# Patient Record
Sex: FEMALE | Race: WHITE | Employment: STUDENT | URBAN - METROPOLITAN AREA
[De-identification: names, ages, dates, MRNs, and addresses within clinical notes are randomized per-mention and may not be internally consistent; named-entity substitution may affect disease eponyms.]

---
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
## 2022-08-23 ENCOUNTER — OFFICE VISIT (OUTPATIENT)
Dept: FAMILY MEDICINE CLINIC | Facility: CLINIC | Age: 22
End: 2022-08-23
Payer: COMMERCIAL

## 2022-08-23 VITALS
SYSTOLIC BLOOD PRESSURE: 112 MMHG | DIASTOLIC BLOOD PRESSURE: 76 MMHG | HEART RATE: 77 BPM | OXYGEN SATURATION: 100 % | WEIGHT: 100.6 LBS | HEIGHT: 65 IN | TEMPERATURE: 98 F | BODY MASS INDEX: 16.76 KG/M2

## 2022-08-23 DIAGNOSIS — R10.12 LEFT UPPER QUADRANT ABDOMINAL PAIN: Primary | ICD-10-CM

## 2022-08-23 DIAGNOSIS — F41.9 ANXIETY: ICD-10-CM

## 2022-08-23 DIAGNOSIS — R16.1 SPLENOMEGALY: ICD-10-CM

## 2022-08-23 DIAGNOSIS — R51.9 ACUTE NONINTRACTABLE HEADACHE, UNSPECIFIED HEADACHE TYPE: ICD-10-CM

## 2022-08-23 PROCEDURE — 99204 OFFICE O/P NEW MOD 45 MIN: CPT | Performed by: STUDENT IN AN ORGANIZED HEALTH CARE EDUCATION/TRAINING PROGRAM

## 2022-08-23 RX ORDER — HYDROXYZINE HYDROCHLORIDE 25 MG/1
25 TABLET, FILM COATED ORAL
Qty: 30 TABLET | Refills: 0 | Status: SHIPPED | OUTPATIENT
Start: 2022-08-23 | End: 2022-10-24 | Stop reason: ALTCHOICE

## 2022-08-23 NOTE — LETTER
August 23, 2022     Patient: Jeremiah Devi  YOB: 2000  Date of Visit: 8/23/2022      To Whom it May Concern:    Jeremiah Devi is under my professional care  Lilliana Rokyle was seen in my office on 8/23/2022  Lilliana Zepeda may return to work on 8/29/22  If you have any questions or concerns, please don't hesitate to call           Sincerely,          Sindi Steiner        CC: No Recipients

## 2022-08-23 NOTE — PROGRESS NOTES
Assessment/Plan:    Left upper quadrant abdominal pain  Reviewed previous labs done in June  Will order MRI of the abdomen due to unexplained splenomegaly with left upper quadrant pain  Will order amylase lipase  UA orders given in office with other labs  Will refer to Hematology Oncology for hematologic workup of splenomegaly and abdominal pain  Prescription for hydroxyzine given for anxiety to be taken before MRI procedure  Advised that medication can make you drowsy    Splenomegaly  Refer to hematology  MRI of the abdomen ordered to further evaluate splenomegaly  Diagnoses and all orders for this visit:    Left upper quadrant abdominal pain  Comments: Will order MRI of the abdomen  Orders:  -     Amylase; Future  -     Lipase; Future  -     MRI abdomen w wo contrast; Future  -     Cancel: POCT urine dip  -     Cancel: POCT urine HCG  -     UA (URINE) with reflex to Scope    Acute nonintractable headache, unspecified headache type  Comments:  Advised to take Aleve in the morning for headache in to help with abdominal pain  Recommend to limit NSAID usage unless pain is above 4-5  Splenomegaly  -     MRI abdomen w wo contrast; Future  -     Ambulatory Referral to Hematology / Oncology; Future    Anxiety  -     hydrOXYzine HCL (ATARAX) 25 mg tablet; Take 1 tablet (25 mg total) by mouth daily at bedtime Take 2 tablets 30 minutes before MRI procedure  Greater than 30 minutes spent reviewing patient's lab work and previous urgent care visits  In addition to 20 minutes spent with patient in room  Subjective:      Patient ID: Dimitri Hamman is a 23 y o  female  HPI    Patient is a 68-year-old female new patient presenting to clinic for left upper abdominal pain  Patient was in room with mother present and a portion of history was obtained from her  Patient has been having abdominal pain, nausea, occasional vomiting has been chronic going on for over half a year    She states that she has had hospital stay in the past at [facility] for which she was on antibiotics and had a CT scan of the abdomen which showed enlarged spleen  She has had multiple visits to urgent care for the same complaints  She was referred to GI who she states recommended MRI and endoscope initially  She is very anxious and did not want to undergo MRI so they ended up doing an ultrasound which showed splenomegaly but no other findings  Lab review from most recent labs in June showed elevated white count of 11 5 and elevated neutrophil number  EBV IgG capsid was positive but all other tests were negative  Patient was advised by specialist to see Hematology but never went  She works and is worried about her job and missing time  She states that abdominal pain is sharp and achy in nature it does not radiate  No foods seem to set off symptoms  Denies any heartburn symptoms  States that she is unsure of what sets it off  She did see urgent care yesterday and was given Cipro for UTI  She has not started antibiotics yet  She does state that she feels a little short of breath when the abdominal pain gets very bad  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  She denies any current urinary frequency or pain  She is unable to produce urine for testing today  The following portions of the patient's history were reviewed and updated as appropriate: allergies, current medications, past family history, past medical history, past social history, past surgical history and problem list     Review of Systems   Respiratory: Positive for shortness of breath  Negative for cough and wheezing  Cardiovascular: Negative for chest pain  Gastrointestinal: Positive for abdominal pain and nausea  Negative for constipation, diarrhea and vomiting  Splenomegaly   Genitourinary: Negative for difficulty urinating, dysuria, flank pain and urgency  Psychiatric/Behavioral: The patient is nervous/anxious  Objective:      /76 (BP Location: Left arm, Patient Position: Sitting, Cuff Size: Standard)   Pulse 77   Temp 98 °F (36 7 °C)   Ht 5' 5" (1 651 m)   Wt 45 6 kg (100 lb 9 6 oz)   SpO2 100%   BMI 16 74 kg/m²          Physical Exam  Vitals and nursing note reviewed  Constitutional:       General: She is not in acute distress  Appearance: Normal appearance  HENT:      Head: Normocephalic and atraumatic  Right Ear: Tympanic membrane, ear canal and external ear normal       Left Ear: Tympanic membrane, ear canal and external ear normal       Nose: Nose normal       Mouth/Throat:      Mouth: Mucous membranes are moist       Pharynx: Oropharynx is clear  Eyes:      Extraocular Movements: Extraocular movements intact  Conjunctiva/sclera: Conjunctivae normal       Pupils: Pupils are equal, round, and reactive to light  Cardiovascular:      Rate and Rhythm: Normal rate and regular rhythm  Heart sounds: Normal heart sounds  No murmur heard  No friction rub  No gallop  Pulmonary:      Effort: Pulmonary effort is normal  No respiratory distress  Breath sounds: Normal breath sounds  No wheezing  Abdominal:      General: Abdomen is flat  Bowel sounds are normal       Palpations: Abdomen is soft  There is splenomegaly  Tenderness: There is abdominal tenderness in the left upper quadrant  There is no right CVA tenderness, left CVA tenderness, guarding or rebound  Negative signs include Garcia's sign and McBurney's sign  Musculoskeletal:         General: No tenderness  Normal range of motion  Cervical back: Normal range of motion and neck supple  Lymphadenopathy:      Cervical: No cervical adenopathy  Skin:     General: Skin is warm and dry  Findings: No erythema or rash  Neurological:      General: No focal deficit present  Mental Status: She is alert and oriented to person, place, and time  Mental status is at baseline     Psychiatric:         Mood and Affect: Mood normal          Behavior: Behavior normal          Thought Content:  Thought content normal          Judgment: Judgment normal

## 2022-08-23 NOTE — ASSESSMENT & PLAN NOTE
Reviewed previous labs done in June  Will order MRI of the abdomen due to unexplained splenomegaly with left upper quadrant pain  Will order amylase lipase  UA orders given in office with other labs  Will refer to Hematology Oncology for hematologic workup of splenomegaly and abdominal pain  Prescription for hydroxyzine given for anxiety to be taken before MRI procedure    Advised that medication can make you drowsy

## 2022-08-29 ENCOUNTER — OFFICE VISIT (OUTPATIENT)
Dept: FAMILY MEDICINE CLINIC | Facility: CLINIC | Age: 22
End: 2022-08-29
Payer: COMMERCIAL

## 2022-08-29 VITALS
DIASTOLIC BLOOD PRESSURE: 70 MMHG | OXYGEN SATURATION: 98 % | HEIGHT: 65 IN | HEART RATE: 88 BPM | BODY MASS INDEX: 16.66 KG/M2 | TEMPERATURE: 98 F | WEIGHT: 100 LBS | SYSTOLIC BLOOD PRESSURE: 112 MMHG

## 2022-08-29 DIAGNOSIS — R16.1 SPLENOMEGALY: ICD-10-CM

## 2022-08-29 DIAGNOSIS — R51.9 ACUTE NONINTRACTABLE HEADACHE, UNSPECIFIED HEADACHE TYPE: ICD-10-CM

## 2022-08-29 DIAGNOSIS — R10.12 LEFT UPPER QUADRANT ABDOMINAL PAIN: Primary | ICD-10-CM

## 2022-08-29 PROCEDURE — 99213 OFFICE O/P EST LOW 20 MIN: CPT | Performed by: STUDENT IN AN ORGANIZED HEALTH CARE EDUCATION/TRAINING PROGRAM

## 2022-08-29 PROCEDURE — NAPROXEN 500MG NAPROXEN 500MG: Performed by: STUDENT IN AN ORGANIZED HEALTH CARE EDUCATION/TRAINING PROGRAM

## 2022-08-29 RX ORDER — NAPROXEN 500 MG/1
500 TABLET ORAL 2 TIMES DAILY WITH MEALS
Qty: 30 TABLET | Refills: 0
Start: 2022-08-29 | End: 2022-10-24 | Stop reason: ALTCHOICE

## 2022-08-29 NOTE — PROGRESS NOTES
Assessment/Plan:     Left upper quadrant abdominal pain  MRI of the abdomen has been ordered  During authorization, patient had MRI ordered by GI specialist back in June  Patient or mother has to call to get in for MRI appointment  Patient has been referred to hematology for workup of a blood related cause  Recommended to call listed number on referral   We will get case management on board if close appointment cannot be made  Labs from last visit have not been done by patient yet  Will start short trial of naproxen for pain and headache  Patient will need to return to work tomorrow  Splenomegaly  Referred to hematology  MRI of the abdomen ordered  Diagnoses and all orders for this visit:    Left upper quadrant abdominal pain  -     naproxen (Naprosyn) 500 mg tablet; Take 1 tablet (500 mg total) by mouth 2 (two) times a day with meals    Splenomegaly    Acute nonintractable headache, unspecified headache type  Comments:  Patient stated that she has poor vision and does not wear her eyeglasses at all  I recommended to wear her glasses daily to see if her headache improves  Orders:  -     naproxen (Naprosyn) 500 mg tablet; Take 1 tablet (500 mg total) by mouth 2 (two) times a day with meals          Patient is to return to work tomorrow  Subjective:      Patient ID: Petros Orellana is a 23 y o  female  HPI    The following portions of the patient's history were reviewed and updated as appropriate: allergies, current medications, past family history, past medical history, past social history, past surgical history and problem list     Patient is a 82-year-old female patient presenting to clinic for follow up on left upper abdominal pain  Patient was in room with mother present and a portion of history was obtained from her  Patient has been having abdominal pain, nausea, occasional vomiting has been chronic going on for over half a year    She states that she has had hospital stay in the past at [facility] for which she was on antibiotics and had a CT scan of the abdomen which showed enlarged spleen  She has had multiple visits to urgent care for the same complaints  She was referred to GI who she states recommended MRI and endoscope initially  She is very anxious and did not want to undergo MRI so they ended up doing an ultrasound which showed splenomegaly but no other findings  Lab review from most recent labs in June showed elevated white count of 11 5 and elevated neutrophil number  EBV IgG capsid was positive but all other tests were negative  Patient was advised by specialist to see Hematology but never went  She works and is worried about her job and missing time  Last visit was 6 days ago  Patient is back with dull aching pain in left upper quadrant and headache  I spoke to the patient's mother  They have not called about MRI or hematology referral yet  Patient's mother states that she had no problems over the weekend and was fine around her friends visiting  She suddenly started having her stomach pain again on Sunday night  I believe there may be an anxious component to the her symptoms  No new changes or worsening of pain  No radiating pain  10 minutes spent reviewing chart and previous visit  Review of Systems   Respiratory: Positive for shortness of breath  Intermittent when anxious   Cardiovascular: Negative for chest pain  Gastrointestinal: Positive for abdominal pain and nausea  Negative for constipation, diarrhea and vomiting  Genitourinary: Negative for difficulty urinating  Skin: Negative for rash  Neurological: Positive for headaches  Psychiatric/Behavioral: The patient is nervous/anxious            Objective:      /70 (BP Location: Left arm, Patient Position: Sitting, Cuff Size: Standard)   Pulse 88   Temp 98 °F (36 7 °C)   Ht 5' 5" (1 651 m)   Wt 45 4 kg (100 lb)   SpO2 98%   BMI 16 64 kg/m²          Physical Exam  Vitals and nursing note reviewed  Constitutional:       General: She is not in acute distress  Appearance: Normal appearance  HENT:      Head: Normocephalic and atraumatic  Right Ear: Tympanic membrane, ear canal and external ear normal       Left Ear: Tympanic membrane, ear canal and external ear normal       Nose: Nose normal       Mouth/Throat:      Mouth: Mucous membranes are moist       Pharynx: Oropharynx is clear  Eyes:      Extraocular Movements: Extraocular movements intact  Conjunctiva/sclera: Conjunctivae normal       Pupils: Pupils are equal, round, and reactive to light  Cardiovascular:      Rate and Rhythm: Normal rate and regular rhythm  Heart sounds: Normal heart sounds  No murmur heard  No friction rub  No gallop  Pulmonary:      Effort: Pulmonary effort is normal  No respiratory distress  Breath sounds: Normal breath sounds  No wheezing  Abdominal:      General: Abdomen is flat  Bowel sounds are normal       Tenderness: There is abdominal tenderness in the left upper quadrant  There is no right CVA tenderness, left CVA tenderness, guarding or rebound  Negative signs include Garcia's sign and McBurney's sign  Musculoskeletal:         General: No tenderness  Normal range of motion  Cervical back: Normal range of motion and neck supple  Lymphadenopathy:      Cervical: No cervical adenopathy  Skin:     General: Skin is warm and dry  Findings: No erythema or rash  Neurological:      General: No focal deficit present  Mental Status: She is alert and oriented to person, place, and time  Mental status is at baseline  Psychiatric:         Mood and Affect: Mood normal          Behavior: Behavior normal          Thought Content:  Thought content normal          Judgment: Judgment normal

## 2022-08-29 NOTE — ASSESSMENT & PLAN NOTE
MRI of the abdomen has been ordered  During authorization, patient had MRI ordered by GI specialist back in June  Patient or mother has to call to get in for MRI appointment  Patient has been referred to hematology for workup of a blood related cause  Recommended to call listed number on referral   We will get case management on board if close appointment cannot be made  Labs from last visit have not been done by patient yet  Will start short trial of naproxen for pain and headache  Patient will need to return to work tomorrow

## 2022-08-29 NOTE — LETTER
August 29, 2022     Patient: Dimitri Hamman  YOB: 2000  Date of Visit: 8/29/2022      To Whom it May Concern:    Dimitri Hamman is under my professional care  Ghoshsavannah Meehan was seen in my office on 8/29/2022  Davina Meehan may return to work on 8/30/22  Work excuse is for 8/23/22 until the indicated return date  If you have any questions or concerns, please don't hesitate to call           Sincerely,          April John        CC: No Recipients

## 2022-09-09 ENCOUNTER — TELEPHONE (OUTPATIENT)
Dept: OTHER | Facility: OTHER | Age: 22
End: 2022-09-09

## 2022-09-09 NOTE — TELEPHONE ENCOUNTER
[facility] called stating they received a fax regarding patient needing to be seen at their facility  They require additional information to be faxed to them  They need radiology report, labs, and all doctor notes    Please fax to [phone]

## 2022-09-09 NOTE — TELEPHONE ENCOUNTER
Patient's mother, Ember Sparks called in to confirm medication prescribed for patient to take prior to MRI today and when to take it  Prescribed Hydroxyzine HCl 25 mg and to take 2 tablets 30 minutes prior to MRI at 6 PM  Ember Sparks verbalized understanding to take before leaving home with 20 minute drive to facility and registration before imaging

## 2022-09-15 ENCOUNTER — TELEPHONE (OUTPATIENT)
Dept: FAMILY MEDICINE CLINIC | Facility: CLINIC | Age: 22
End: 2022-09-15

## 2022-09-15 NOTE — TELEPHONE ENCOUNTER
Patient and her mother stopped in to drop off her MRI results  I placed them on your desk and also faxed them to the scan team  Mom also stated she has an appt with the hematologist on 9/27 as well

## 2022-09-16 ENCOUNTER — TELEPHONE (OUTPATIENT)
Dept: FAMILY MEDICINE CLINIC | Facility: CLINIC | Age: 22
End: 2022-09-16

## 2022-09-16 DIAGNOSIS — Q62.5 DUPLICATED URETER, LEFT: ICD-10-CM

## 2022-09-16 DIAGNOSIS — N13.30 HYDRONEPHROSIS OF LEFT KIDNEY: Primary | ICD-10-CM

## 2022-09-16 DIAGNOSIS — R10.12 LEFT UPPER QUADRANT ABDOMINAL PAIN: ICD-10-CM

## 2022-09-16 NOTE — TELEPHONE ENCOUNTER
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

## 2022-10-10 LAB — EXTERNAL HIV SCREEN: NORMAL

## 2022-10-14 PROBLEM — N13.30 HYDRONEPHROSIS OF LEFT KIDNEY: Status: ACTIVE | Noted: 2022-10-14

## 2022-10-14 PROBLEM — Q62.5 DUPLICATED URETER, LEFT: Status: ACTIVE | Noted: 2022-10-14

## 2022-10-14 NOTE — PROGRESS NOTES
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  Recent hydronephrosis  Referred to Nephrology  Left upper quadrant abdominal pain  MRI of the abdomen was unremarkable for splenic findings  Prior history of gi workup  She was referred for splenomegaly workup to hematology and labs have shown low on iron and some phlegm a nate markers have been elevated  Per patient report she is going to have a skeletal scan and additional labs performed  There is still no cause for her splenomegaly  Splenomegaly  Workup with hematology has been negative for definitive cause yet  Diagnoses and all orders for this visit:    Physical exam, pre-employment    Mass of left breast, unspecified quadrant  Comments:  Patient reports palpable mass left breast   Will order ultrasound to evaluate at this time  Orders:  -     US breast left complete (abus); Future    Left upper quadrant abdominal pain    Hydronephrosis of left kidney    Splenomegaly    Underweight    Duplicated ureter, left          Will order ultrasound of the left breast due to patient report of lump sensation  Patient's mother has a history of multiple lumps of the breast   No reported history of cancer  Patient is clear to start work at this time  She is continuing to be worked up by Hematology for her splenomegaly  No definitive findings at this time  She is undergoing iron treatments for iron deficiency  I will advise patient based on her ultrasound results  Tentative follow-up recommended in 3 months  Patient is in agreement with the plan  Subjective:      Patient ID: Warden Mas is a 23 y o  female presenting for follow-up    See problem based assessment as above  HPI    The following portions of the patient's history were reviewed and updated as appropriate: allergies, current medications, past family history, past medical history, past social history, past surgical history and problem list     Patient is a 74-year-old female presenting for follow-up from previous evaluation for splenomegaly, left upper quadrant abdominal pain  She had an MRI performed which showed a double ureter on the left side with hydronephrosis in the upper portion of the kidney  Patient was referred to hematology and Urology for evaluation  Patient has been seeing Hematology Dr Chi Lovett  All lab workup has shown iron deficiency and some elevated inflammatory markers    Patient was referred to Urology for hydronephrosis  She has a visit with them next week  She is here for an employment physical today  She will be starting a new job  She denies any problems with mental status, physical activity, performing the activities of her new job  Physical exam was within normal limits  Patient does have a low BMI which has been slowly improving  She is being worked up by Hematology for some abnormal findings of low iron and elevated inflammatory markers with splenomegaly  She currently has no abdominal pain  She does not have any difficulties on exam today that would prevent her from doing her work  She reports recently that she feels that palpable mass of the left breast when she does a breast exam   Her mother states that she has a history of breast masses that had to be removed  They were all benign however  They would like to be evaluated with imaging  Review of Systems   Constitutional:        Reported palpable breast mass left side   Respiratory: Negative for shortness of breath (None currently  Does report some dyspnea when abdominal pain happen)  Cardiovascular: Negative for chest pain  Gastrointestinal: Positive for abdominal pain   Negative for constipation, diarrhea, nausea and vomiting  Splenomegaly   Genitourinary: Negative for difficulty urinating  History of hydronephrosis   Musculoskeletal: Positive for back pain  Skin: Negative for rash  Objective:      /80   Pulse 98   Ht 5' 3" (1 6 m)   Wt 44 5 kg (98 lb 3 2 oz)   SpO2 98%   BMI 17 40 kg/m²          Physical Exam  Vitals and nursing note reviewed  Constitutional:       General: She is not in acute distress  Appearance: Normal appearance  HENT:      Head: Normocephalic and atraumatic  Right Ear: Tympanic membrane, ear canal and external ear normal       Left Ear: Tympanic membrane, ear canal and external ear normal       Nose: Nose normal       Mouth/Throat:      Mouth: Mucous membranes are moist       Pharynx: Oropharynx is clear  Eyes:      Extraocular Movements: Extraocular movements intact  Conjunctiva/sclera: Conjunctivae normal       Pupils: Pupils are equal, round, and reactive to light  Cardiovascular:      Rate and Rhythm: Normal rate and regular rhythm  Heart sounds: Normal heart sounds  No murmur heard  No friction rub  No gallop  Pulmonary:      Effort: Pulmonary effort is normal  No respiratory distress  Breath sounds: Normal breath sounds  No wheezing  Abdominal:      General: Abdomen is flat  Bowel sounds are normal       Palpations: Abdomen is soft  Tenderness: There is abdominal tenderness (luq)  There is no right CVA tenderness or left CVA tenderness  Musculoskeletal:         General: No tenderness  Normal range of motion  Cervical back: Normal range of motion and neck supple  Lymphadenopathy:      Cervical: No cervical adenopathy  Skin:     General: Skin is warm and dry  Findings: No erythema or rash  Neurological:      General: No focal deficit present  Mental Status: She is alert and oriented to person, place, and time  Mental status is at baseline     Psychiatric:         Mood and Affect: Mood normal          Behavior: Behavior normal          Thought Content:  Thought content normal          Judgment: Judgment normal

## 2022-10-24 ENCOUNTER — OFFICE VISIT (OUTPATIENT)
Dept: FAMILY MEDICINE CLINIC | Facility: CLINIC | Age: 22
End: 2022-10-24

## 2022-10-24 VITALS
BODY MASS INDEX: 17.4 KG/M2 | DIASTOLIC BLOOD PRESSURE: 80 MMHG | SYSTOLIC BLOOD PRESSURE: 110 MMHG | HEART RATE: 98 BPM | OXYGEN SATURATION: 98 % | HEIGHT: 63 IN | WEIGHT: 98.2 LBS

## 2022-10-24 DIAGNOSIS — R10.12 LEFT UPPER QUADRANT ABDOMINAL PAIN: ICD-10-CM

## 2022-10-24 DIAGNOSIS — N13.30 HYDRONEPHROSIS OF LEFT KIDNEY: ICD-10-CM

## 2022-10-24 DIAGNOSIS — N63.20 MASS OF LEFT BREAST, UNSPECIFIED QUADRANT: ICD-10-CM

## 2022-10-24 DIAGNOSIS — R63.6 UNDERWEIGHT: ICD-10-CM

## 2022-10-24 DIAGNOSIS — Q62.5 DUPLICATED URETER, LEFT: ICD-10-CM

## 2022-10-24 DIAGNOSIS — R16.1 SPLENOMEGALY: ICD-10-CM

## 2022-10-24 DIAGNOSIS — Z02.1 PHYSICAL EXAM, PRE-EMPLOYMENT: Primary | ICD-10-CM

## 2022-10-24 PROCEDURE — 99213 OFFICE O/P EST LOW 20 MIN: CPT | Performed by: STUDENT IN AN ORGANIZED HEALTH CARE EDUCATION/TRAINING PROGRAM

## 2022-10-24 NOTE — ASSESSMENT & PLAN NOTE
Patient found to have left sided upper pole hydronephrosis on mri  It was persistent and not evaluated further  I referred patient to nephrology  She has appointment with Nephrology scheduled for next week

## 2022-10-24 NOTE — ASSESSMENT & PLAN NOTE
Found on mri  Congenital discovered on imaging incidentally  Recent hydronephrosis  Referred to Nephrology

## 2022-10-24 NOTE — ASSESSMENT & PLAN NOTE
MRI of the abdomen was unremarkable for splenic findings  Prior history of gi workup  She was referred for splenomegaly workup to hematology and labs have shown low on iron and some phlegm a nate markers have been elevated  Per patient report she is going to have a skeletal scan and additional labs performed  There is still no cause for her splenomegaly

## 2022-11-01 ENCOUNTER — TELEPHONE (OUTPATIENT)
Dept: FAMILY MEDICINE CLINIC | Facility: CLINIC | Age: 22
End: 2022-11-01

## 2022-11-02 ENCOUNTER — TELEMEDICINE (OUTPATIENT)
Dept: FAMILY MEDICINE CLINIC | Facility: CLINIC | Age: 22
End: 2022-11-02

## 2022-11-02 DIAGNOSIS — R53.83 OTHER FATIGUE: ICD-10-CM

## 2022-11-02 DIAGNOSIS — R09.81 CONGESTION OF NASAL SINUS: ICD-10-CM

## 2022-11-02 DIAGNOSIS — H92.03 OTALGIA OF BOTH EARS: ICD-10-CM

## 2022-11-02 DIAGNOSIS — B34.9 VIRAL SYNDROME: Primary | ICD-10-CM

## 2022-11-02 DIAGNOSIS — R05.1 ACUTE COUGH: ICD-10-CM

## 2022-11-02 LAB
SARS-COV-2 AG UPPER RESP QL IA: NEGATIVE
VALID CONTROL: NORMAL

## 2022-11-02 RX ORDER — METHYLPREDNISOLONE 4 MG/1
TABLET ORAL
Qty: 21 EACH | Refills: 0
Start: 2022-11-02

## 2022-11-02 NOTE — LETTER
November 2, 2022     Patient: Lakshmi Adair  YOB: 2000  Date of Visit: 11/2/2022      To Whom it May Concern:    Lakshmi Adair is under my professional care  Delores Dowd was seen in my office on 11/2/2022  Delores Dowd may return to work on 11/3/22  If you have any questions or concerns, please don't hesitate to call           Sincerely,          Susannah Myers        CC: No Recipients

## 2022-11-02 NOTE — PROGRESS NOTES
Virtual Regular Visit    Verification of patient location:    Patient is located in the following state in which I hold an active license [place]      Assessment/Plan:    Problem List Items Addressed This Visit    None     Visit Diagnoses     Viral syndrome    -  Primary    Relevant Medications    methylPREDNISolone 4 MG tablet therapy pack    Congestion of nasal sinus        Relevant Medications    methylPREDNISolone 4 MG tablet therapy pack    Other Relevant Orders    POCT Rapid Covid Ag (Completed)    Other fatigue        Relevant Orders    POCT Rapid Covid Ag (Completed)    Acute cough        Relevant Medications    methylPREDNISolone 4 MG tablet therapy pack    Other Relevant Orders    POCT Rapid Covid Ag (Completed)    Otalgia of both ears        Relevant Medications    methylPREDNISolone 4 MG tablet therapy pack        Patient's symptoms are consistent with a viral presentation  Covid test was done in office and was negative  Recommend supportive care at home  Patient was prescribed Medrol Dosepak  Advised that typical viral course last anywhere from 7-10 days  Will give patient return to work letter for tomorrow  I recommended against antibiotic use at this time patient is to call office if symptoms are lasting greater than 10 days and I will place her on Augmentin at that time  Patient and her mother are in agreement with the plan  Recommend supportive care with fluids, rest, flonase 1-2 sprays each nostril, otc claritin or zyrtec daily and mucinex as directed  Tylenol recommended prn for pain or fever    Instructed pt RTO if symptoms persist or worsen over the course of the next week       Reason for visit is   Chief Complaint   Patient presents with   • Shortness of Breath     Coughing, belly ache and earpain        Encounter provider [facility]    Provider located at [facility] [address] [place] [address] RD  [facility] [place] [id number]      Recent Visits  Date Type Provider Dept   11/01/22 Telephone Lesley Sanchez, [facility]   Showing recent visits within past 7 days and meeting all other requirements  Today's Visits  Date Type Provider Dept   11/02/22 Laci 19 today's visits and meeting all other requirements  Future Appointments  No visits were found meeting these conditions  Showing future appointments within next 150 days and meeting all other requirements       The patient was identified by name and date of birth  Naomi Osorio was informed that this is a telemedicine visit and that the visit is being conducted through the [employer]  She agrees to proceed     My office door was closed  No one else was in the room  She acknowledged consent and understanding of privacy and security of the video platform  The patient has agreed to participate and understands they can discontinue the visit at any time  Patient is aware this is a billable service  Melisa Castanon is a 23 y o  female established patient for acute sick visit  Cough  Patient complains of bilateral ear pain, headache frontal, nasal congestion and nonproductive cough, intermittent shortness of breath  Symptoms began 2 days ago  Symptoms have been gradually worsening since that time  The cough is nonproductive and is aggravated by nothing  Associated symptoms include: postnasal drip and shortness of breath  Patient does not have new pets  Patient does not have a history of asthma  Patient does not have a history of environmental allergens  Patient has not traveled recently  Patient reports no recent sick exposures  She did have an episode of diarrhea as well  No other reported symptoms at this time        HPI     Past Medical History:   Diagnosis Date   • Enlargement, spleen    • Hydronephrosis        Past Surgical History: Procedure Laterality Date   • KIDNEY SURGERY      5 mths old       Current Outpatient Medications   Medication Sig Dispense Refill   • methylPREDNISolone 4 MG tablet therapy pack Use as directed on package 21 each 0     No current facility-administered medications for this visit  No Known Allergies    Review of Systems   Constitutional: Positive for fatigue  HENT: Positive for congestion, ear pain, postnasal drip and rhinorrhea  Respiratory: Positive for cough and shortness of breath  Cardiovascular: Negative for chest pain  Gastrointestinal: Positive for diarrhea  Negative for abdominal pain, constipation, nausea and vomiting  Video Exam    There were no vitals filed for this visit  Physical Exam  Constitutional:       General: She is not in acute distress  Appearance: Normal appearance  She is ill-appearing  She is not toxic-appearing  HENT:      Head: Normocephalic and atraumatic  Right Ear: External ear normal       Left Ear: External ear normal       Nose: Congestion present  Mouth/Throat:      Mouth: Mucous membranes are moist       Comments: Postnasal drip  Eyes:      Extraocular Movements: Extraocular movements intact  Conjunctiva/sclera: Conjunctivae normal       Pupils: Pupils are equal, round, and reactive to light  Pulmonary:      Effort: Pulmonary effort is normal    Musculoskeletal:         General: Normal range of motion  Cervical back: Normal range of motion  Skin:     General: Skin is dry  Neurological:      Mental Status: She is alert and oriented to person, place, and time  Mental status is at baseline  Psychiatric:         Mood and Affect: Mood normal          Behavior: Behavior normal          Thought Content:  Thought content normal          Judgment: Judgment normal           I spent 15 minutes directly with the patient during this visit

## 2023-01-24 ENCOUNTER — CLINICAL SUPPORT (OUTPATIENT)
Dept: FAMILY MEDICINE CLINIC | Facility: CLINIC | Age: 23
End: 2023-01-24

## 2023-01-24 DIAGNOSIS — Z11.1 ENCOUNTER FOR PPD TEST: Primary | ICD-10-CM

## 2023-01-26 ENCOUNTER — CLINICAL SUPPORT (OUTPATIENT)
Dept: FAMILY MEDICINE CLINIC | Facility: CLINIC | Age: 23
End: 2023-01-26

## 2023-01-26 DIAGNOSIS — Z11.1 ENCOUNTER FOR PPD SKIN TEST READING: Primary | ICD-10-CM

## 2023-01-26 PROBLEM — N63.20 MASS OF LEFT BREAST: Status: ACTIVE | Noted: 2023-01-26

## 2023-01-26 LAB
INDURATION: 0 MM
TB SKIN TEST: NEGATIVE

## 2023-02-03 ENCOUNTER — OFFICE VISIT (OUTPATIENT)
Dept: FAMILY MEDICINE CLINIC | Facility: CLINIC | Age: 23
End: 2023-02-03

## 2023-02-03 VITALS
BODY MASS INDEX: 17.36 KG/M2 | OXYGEN SATURATION: 99 % | HEART RATE: 67 BPM | SYSTOLIC BLOOD PRESSURE: 108 MMHG | WEIGHT: 98 LBS | DIASTOLIC BLOOD PRESSURE: 70 MMHG | TEMPERATURE: 97.5 F | HEIGHT: 63 IN

## 2023-02-03 DIAGNOSIS — J01.90 ACUTE SINUSITIS, RECURRENCE NOT SPECIFIED, UNSPECIFIED LOCATION: Primary | ICD-10-CM

## 2023-02-03 DIAGNOSIS — R09.81 CONGESTION OF NASAL SINUS: ICD-10-CM

## 2023-02-03 DIAGNOSIS — J02.9 SORE THROAT: ICD-10-CM

## 2023-02-03 LAB
S PYO AG THROAT QL: NEGATIVE
SARS-COV-2 AG UPPER RESP QL IA: NEGATIVE
VALID CONTROL: NORMAL

## 2023-02-03 RX ORDER — FOLIC ACID 1 MG/1
TABLET ORAL
COMMUNITY
Start: 2023-02-01

## 2023-02-03 RX ORDER — GUAIFENESIN 600 MG/1
1200 TABLET, EXTENDED RELEASE ORAL EVERY 12 HOURS SCHEDULED
Qty: 20 TABLET | Refills: 0
Start: 2023-02-03

## 2023-02-03 RX ORDER — CHOLECALCIFEROL (VITAMIN D3) 1250 MCG
1 CAPSULE ORAL WEEKLY
COMMUNITY
Start: 2023-01-06

## 2023-02-03 RX ORDER — AMOXICILLIN 500 MG/1
CAPSULE ORAL
COMMUNITY
Start: 2023-01-31

## 2023-02-03 RX ORDER — AMOXICILLIN AND CLAVULANATE POTASSIUM 875; 125 MG/1; MG/1
1 TABLET, FILM COATED ORAL EVERY 12 HOURS SCHEDULED
Qty: 20 TABLET | Refills: 0
Start: 2023-02-03 | End: 2023-02-13

## 2023-02-03 NOTE — LETTER
February 3, 2023     Patient: Nael Ulloa  YOB: 2000  Date of Visit: 2/3/2023      To Whom it May Concern:    Nael Ulloa is under my professional care  Roxy Sebas was seen in my office on 2/3/2023  Roxy Mullen may return to work on 2/6/23  If you have any questions or concerns, please don't hesitate to call           Sincerely,          Richelle White        CC: No Recipients

## 2023-02-03 NOTE — PROGRESS NOTES
Assessment/Plan:    No problem-specific Assessment & Plan notes found for this encounter  Diagnoses and all orders for this visit:    Acute sinusitis, recurrence not specified, unspecified location  -     amoxicillin-clavulanate (Augmentin) 875-125 mg per tablet; Take 1 tablet by mouth every 12 (twelve) hours for 10 days  -     guaiFENesin (MUCINEX) 600 mg 12 hr tablet; Take 2 tablets (1,200 mg total) by mouth every 12 (twelve) hours    Congestion of nasal sinus  -     POCT Rapid Covid Ag  -     guaiFENesin (MUCINEX) 600 mg 12 hr tablet; Take 2 tablets (1,200 mg total) by mouth every 12 (twelve) hours    Sore throat  -     POCT rapid strepA    Other orders  -     amoxicillin (AMOXIL) 500 mg capsule; TAKE 1 CAPSULE 3 TIMES A DAY UNTIL GONE  -     folic acid (FOLVITE) 1 mg tablet  -     Cholecalciferol (Vitamin D3) 1 25 MG (25489 UT) CAPS; 1 capsule once a week          Patient is a 17-year-old female who presents with symptoms of a likely sinus infection  Current differential is viral versus bacterial   She is currently being treated by a dentist for a tooth infection  I will change her antibiotic from amoxicillin to Augmentin twice daily for 10 days  I will also provide her with some Mucinex from our office supply  I recommended to continue supportive care at home and stay hydrated  Recommend supportive care with fluids, rest, flonase 1-2 sprays each nostril, otc claritin or zyrtec daily and mucinex as directed  Tylenol recommended prn for pain or fever  Instructed pt RTO if symptoms persist or worsen over the course of the next week    Subjective:      Patient ID: Nicole Kirkpatrick is a 23 y o  female      HPI    The following portions of the patient's history were reviewed and updated as appropriate: allergies, current medications, past family history, past medical history, past social history, past surgical history and problem list     Nicole Kirkpatrick is a 23 y o  female who presents for evaluation of congestion, pressure, and pain in the nose  Symptoms include post nasal drip, shortness of breath, sinus pressure and tooth pain  Onset of symptoms was 3 days ago and has been gradually worsening since that time  Treatment to date: antibiotics  She has been taking some tylenol for the pain  She states that her brother at home was sick with strep recently  Review of Systems   HENT: Positive for congestion, postnasal drip and sinus pressure  Negative for ear pain  Tooth pain   Respiratory: Positive for shortness of breath (intermittent)  Negative for cough  Cardiovascular: Negative for chest pain  Gastrointestinal: Negative for abdominal pain, constipation, diarrhea, nausea and vomiting  Skin: Negative for rash  Objective:      /70 (BP Location: Right arm, Patient Position: Sitting, Cuff Size: Large)   Pulse 67   Temp 97 5 °F (36 4 °C)   Ht 5' 3" (1 6 m)   Wt 44 5 kg (98 lb)   SpO2 99%   BMI 17 36 kg/m²          Physical Exam  Vitals and nursing note reviewed  Constitutional:       General: She is not in acute distress  Appearance: Normal appearance  She is ill-appearing  HENT:      Head: Normocephalic and atraumatic  Right Ear: Tympanic membrane, ear canal and external ear normal  There is no impacted cerumen  Left Ear: Tympanic membrane, ear canal and external ear normal  There is no impacted cerumen  Nose: Congestion and rhinorrhea present  Mouth/Throat:      Mouth: Mucous membranes are moist       Pharynx: Oropharynx is clear  Posterior oropharyngeal erythema present  No oropharyngeal exudate  Comments: Postnasal drip  Eyes:      Extraocular Movements: Extraocular movements intact  Conjunctiva/sclera: Conjunctivae normal       Pupils: Pupils are equal, round, and reactive to light  Cardiovascular:      Rate and Rhythm: Normal rate and regular rhythm  Heart sounds: Normal heart sounds  No murmur heard  No friction rub   No gallop  Pulmonary:      Effort: Pulmonary effort is normal  No respiratory distress  Breath sounds: Normal breath sounds  No wheezing  Musculoskeletal:         General: Normal range of motion  Cervical back: Normal range of motion  Skin:     General: Skin is warm and dry  Findings: No erythema or rash  Neurological:      General: No focal deficit present  Mental Status: She is alert and oriented to person, place, and time  Mental status is at baseline  Psychiatric:         Mood and Affect: Mood normal          Behavior: Behavior normal          Thought Content:  Thought content normal          Judgment: Judgment normal

## 2023-04-07 ENCOUNTER — TELEPHONE (OUTPATIENT)
Dept: FAMILY MEDICINE CLINIC | Facility: CLINIC | Age: 23
End: 2023-04-07

## 2023-04-07 NOTE — TELEPHONE ENCOUNTER
Patient will come to the [place] [id number] to  her physical form on Monday 4/10/23  It'll be in the patient pick/up for her      Thanks

## 2023-04-28 DIAGNOSIS — N63.10 MASS OF RIGHT BREAST, UNSPECIFIED QUADRANT: Primary | ICD-10-CM

## 2023-04-28 NOTE — PROGRESS NOTES
Patient is at [facility] and reports feelings of right-sided breast lump  She would like an ultrasound of the right breast   I will order ultrasound to be faxed over as she is getting her left done currently

## 2023-05-01 ENCOUNTER — OFFICE VISIT (OUTPATIENT)
Age: 23
End: 2023-05-01

## 2023-05-01 VITALS
SYSTOLIC BLOOD PRESSURE: 98 MMHG | WEIGHT: 98 LBS | BODY MASS INDEX: 18.03 KG/M2 | DIASTOLIC BLOOD PRESSURE: 60 MMHG | HEART RATE: 120 BPM | HEIGHT: 62 IN | OXYGEN SATURATION: 99 %

## 2023-05-01 DIAGNOSIS — R63.6 UNDERWEIGHT: ICD-10-CM

## 2023-05-01 DIAGNOSIS — R11.0 NAUSEA: ICD-10-CM

## 2023-05-01 DIAGNOSIS — R10.9 ABDOMINAL PAIN, UNSPECIFIED ABDOMINAL LOCATION: ICD-10-CM

## 2023-05-01 DIAGNOSIS — Z09 FOLLOW-UP EXAM: Primary | ICD-10-CM

## 2023-05-01 DIAGNOSIS — R19.7 DIARRHEA, UNSPECIFIED TYPE: ICD-10-CM

## 2023-05-01 RX ORDER — OMEPRAZOLE 40 MG/1
40 CAPSULE, DELAYED RELEASE ORAL DAILY
Qty: 30 CAPSULE | Refills: 0 | Status: SHIPPED | OUTPATIENT
Start: 2023-05-01

## 2023-05-01 RX ORDER — ONDANSETRON 4 MG/1
4 TABLET, FILM COATED ORAL EVERY 8 HOURS PRN
Qty: 20 TABLET | Refills: 0 | Status: SHIPPED | OUTPATIENT
Start: 2023-05-01

## 2023-05-01 NOTE — PROGRESS NOTES
Assessment/Plan:    No problem-specific Assessment & Plan notes found for this encounter  Diagnoses and all orders for this visit:    Follow-up exam    Abdominal pain, unspecified abdominal location  -     omeprazole (PriLOSEC) 40 MG capsule; Take 1 capsule (40 mg total) by mouth daily  -     CBC and differential; Future  -     Comprehensive metabolic panel; Future  -     Lipase; Future  -     Stool Enteric Bacterial Panel by PCR; Future  -     Ova and parasite examination; Future  -     Calprotectin,Fecal; Future  -     Fecal fat, qualitative; Future    Nausea  -     ondansetron (ZOFRAN) 4 mg tablet; Take 1 tablet (4 mg total) by mouth every 8 (eight) hours as needed for nausea or vomiting    Diarrhea, unspecified type    Underweight          59-year-old female presenting for follow-up today with complaints of abdominal pain  We will continue work-up for her abdominal pain  I will place her on a trial of omeprazole to try and see if patient has a positive response  I will order stool studies along with a lipase and CBC and CMP  If this testing is normal then the next step will be a referral over to GI for possible endoscopy  I will keep patient off of work for the next 2 days due to her current nausea and feeling like she will vomit  20 minutes spent on physical exam and plan of care  This note was dictated using software  BMI Counseling: There is no height or weight on file to calculate BMI  The BMI is below normal  Patient advised to gain weight  Rationale for BMI follow-up plan is due to patient being underweight  Subjective:      Patient ID: Maureen Urena is a 23 y o  female      HPI    The following portions of the patient's history were reviewed and updated as appropriate: allergies, current medications, past family history, past medical history, past social history, past surgical history and problem list     Is a 59-year-old female presenting for follow-up today from an emergency "room visit  She continues to have complaints of left-sided abdominal pain  She had 2 episodes over the past 2 weeks for which she had to go to [address] [place] [address] emergency room  The first time she had lab work which was normal and was sent home with a stomach bug   Her second visit she had a CT scan which was also negative along with blood work and they told her the same thing  Her only abnormal finding on her exam to this point has been a slightly enlarged spleen  She continues to follow with the hematologist and they will be doing a bone marrow biopsy in the next few months  She has some nausea and left-sided abdominal pain that is moderate to sharp  There is no association with food  She denies any GERD along with the symptoms  It is an intermittent pain and will come and go  We have continue to work-up the pain and have not been able to find any explanation for her symptoms at this point  She is very underweight and I discussed with her doing boost drinks or other protein shake supplementation  Review of Systems   Constitutional: Positive for fatigue  Respiratory: Negative for shortness of breath  Cardiovascular: Negative for chest pain  Gastrointestinal: Positive for abdominal pain, nausea and vomiting  Negative for constipation and diarrhea  Genitourinary: Negative for difficulty urinating  Skin: Negative for rash  Objective:      BP 98/60 (BP Location: Right arm, Patient Position: Sitting, Cuff Size: Standard)   Pulse (!) 120   Ht 5' 2\" (1 575 m)   Wt 44 5 kg (98 lb)   SpO2 99%   BMI 17 92 kg/m²          Physical Exam  Vitals and nursing note reviewed  Constitutional:       General: She is not in acute distress  Appearance: Normal appearance  HENT:      Head: Normocephalic and atraumatic        Right Ear: External ear normal       Left Ear: External ear normal       Nose: Nose normal       Mouth/Throat:      Mouth: Mucous membranes are moist       Pharynx: Oropharynx is " clear    Eyes:      Extraocular Movements: Extraocular movements intact  Conjunctiva/sclera: Conjunctivae normal       Pupils: Pupils are equal, round, and reactive to light  Cardiovascular:      Rate and Rhythm: Normal rate and regular rhythm  Heart sounds: Normal heart sounds  No murmur heard  No friction rub  No gallop  Pulmonary:      Effort: Pulmonary effort is normal  No respiratory distress  Breath sounds: Normal breath sounds  No wheezing  Abdominal:      General: Abdomen is flat  Bowel sounds are normal       Palpations: Abdomen is soft  Tenderness: There is abdominal tenderness in the epigastric area and left upper quadrant  There is no guarding or rebound  Musculoskeletal:         General: Normal range of motion  Cervical back: Normal range of motion  Skin:     General: Skin is warm and dry  Findings: No erythema or rash  Neurological:      General: No focal deficit present  Mental Status: She is alert and oriented to person, place, and time  Mental status is at baseline  Psychiatric:         Mood and Affect: Mood normal          Behavior: Behavior normal          Thought Content:  Thought content normal          Judgment: Judgment normal

## 2023-05-01 NOTE — LETTER
May 1, 2023     Patient: Sahmeka Mitchell  YOB: 2000  Date of Visit: 5/1/2023      To Whom it May Concern:    Shameka Mitchell is under my professional care  Miracle Bobo was seen in my office on 5/1/2023  Kalinjose antonio Fernandezr may return to work on 5/4/23 or sooner if feeling better  If you have any questions or concerns, please don't hesitate to call           Sincerely,          Alicia Vigil DO        CC: No Recipients

## 2023-05-04 ENCOUNTER — OFFICE VISIT (OUTPATIENT)
Age: 23
End: 2023-05-04

## 2023-05-04 VITALS
TEMPERATURE: 102.8 F | BODY MASS INDEX: 18.22 KG/M2 | WEIGHT: 99 LBS | OXYGEN SATURATION: 99 % | HEART RATE: 129 BPM | HEIGHT: 62 IN | DIASTOLIC BLOOD PRESSURE: 78 MMHG | SYSTOLIC BLOOD PRESSURE: 98 MMHG

## 2023-05-04 DIAGNOSIS — R63.6 UNDERWEIGHT: ICD-10-CM

## 2023-05-04 DIAGNOSIS — R10.12 LEFT UPPER QUADRANT ABDOMINAL PAIN: Primary | ICD-10-CM

## 2023-05-04 DIAGNOSIS — R51.9 ACUTE NONINTRACTABLE HEADACHE, UNSPECIFIED HEADACHE TYPE: ICD-10-CM

## 2023-05-04 DIAGNOSIS — R21 RASH: ICD-10-CM

## 2023-05-04 DIAGNOSIS — E87.1 HYPONATREMIA: ICD-10-CM

## 2023-05-04 DIAGNOSIS — R50.9 FEVER, UNSPECIFIED FEVER CAUSE: ICD-10-CM

## 2023-05-04 DIAGNOSIS — H92.03 OTALGIA OF BOTH EARS: ICD-10-CM

## 2023-05-04 RX ORDER — PREDNISONE 10 MG/1
10 TABLET ORAL DAILY
Qty: 25 TABLET | Refills: 0
Start: 2023-05-04

## 2023-05-04 RX ORDER — AMOXICILLIN AND CLAVULANATE POTASSIUM 875; 125 MG/1; MG/1
1 TABLET, FILM COATED ORAL EVERY 12 HOURS SCHEDULED
Qty: 20 TABLET | Refills: 0
Start: 2023-05-04 | End: 2023-05-14

## 2023-05-04 NOTE — PROGRESS NOTES
Assessment/Plan:    No problem-specific Assessment & Plan notes found for this encounter  Diagnoses and all orders for this visit:    Left upper quadrant abdominal pain  -     Osmolality, urine  -     predniSONE 10 mg tablet; Take 1 tablet (10 mg total) by mouth daily    Otalgia of both ears  -     predniSONE 10 mg tablet; Take 1 tablet (10 mg total) by mouth daily    Acute nonintractable headache, unspecified headache type  -     predniSONE 10 mg tablet; Take 1 tablet (10 mg total) by mouth daily  -     MRI brain w wo contrast; Future    Rash    Fever, unspecified fever cause  -     amoxicillin-clavulanate (Augmentin) 875-125 mg per tablet; Take 1 tablet by mouth every 12 (twelve) hours for 10 days    Underweight  -     Ambulatory referral to complex care management program; Future    Hyponatremia  -     Comprehensive metabolic panel; Future          Patient is a 51-year-old female presenting for follow-up again regarding chronic abdominal pain, nausea headache and now has a rash with ear pain  She was seen at [facility] and had some hyponatremia which is likely due to poor p o  intake and vomiting  She is however having some new worsening of headaches that are becoming harsher in severity  I will order an MRI of the brain to rule out any brain abnormalities  She continues to be thin and underweight and patient's mother would like to have her speak with care management services  She currently has a fever of 102  Her symptoms have been persistent and this fever is new  I will place her on Augmentin twice daily for 10 days and also provide a prednisone course for 5 days  She has not yet done the previous lab work or stool studies that I had ordered  She lost her previous papers for [facility] so I will need to print out a repeat CMP and will add urine osmolality studies  She will need to bring the paper back for the stool studies  Her CBC from yesterday was normal in the ER    At this point I still do not have a definitive cause of her abdominal pain or other chronic symptoms  She has been very thin in ill in appearance but all of her lab work and work-up so far has been normal   If these lab results are normal then our next step will be evaluation by a GI specialist   Written patient a note for work to be off until next Tuesday as she is currently too ill to perform any of her job functions  Continue to take Zofran for nausea as needed  She is to be monitored closely due to dehydration  If she appears listless or has changes in mental status then she will need to seek emergency care immediately for an IV  45 minutes spent on physical exam and plan of care  This note was dictated using software  Subjective:      Patient ID: Naz Clay is a 23 y o  female  HPI    The following portions of the patient's history were reviewed and updated as appropriate: allergies, current medications, past family history, past medical history, past social history, past surgical history and problem list     Patient is a 77-year-old female presenting for follow-up today  She was seen Monday as a follow-up from an emergency room visit  She has continued to have chronic left upper quadrant abdominal pain along with nausea and feeling fatigued  She has been underweight and has an enlarged spleen on both her CT and MRI of the abdomen  She was referred over to hematology who has been working her up without any cause of her splenomegaly  They will be doing a bone marrow biopsy on the 26th  She also has associated headaches with her symptoms as well  She has noted that the headache has increased in severity over the past 2 to 3 weeks  He denies any numbness or tingling but has reported weakness as well  When she was seen in the ER at [facility] she had labs that showed she was hyponatremic na 130  She has felt nausea and has vomited  She has had poor p o  intake    She has some ear pressure and "also a newly developed cough  She has been lethargic and her muscles appear to be wasted  She has always been thin and denies any feelings of negative body image or fear of gaining weight  She patient's mother states that her appetite is very poor because she is a very picky eater and she eats very small portions and skips meals  She cannot do boost drinks as she does not like the taste  Review of Systems   Constitutional: Positive for fatigue  Thin   Respiratory: Positive for cough  Negative for shortness of breath  Cardiovascular: Negative for chest pain  Gastrointestinal: Positive for abdominal pain  Negative for constipation, diarrhea, nausea and vomiting  Skin: Positive for rash  Neurological: Positive for weakness and headaches  Objective:      BP 98/78 (BP Location: Right arm, Patient Position: Sitting, Cuff Size: Standard)   Pulse (!) 129   Temp (!) 102 8 °F (39 3 °C)   Ht 5' 2\" (1 575 m)   Wt 44 9 kg (99 lb)   SpO2 99%   BMI 18 11 kg/m²          Physical Exam  Vitals and nursing note reviewed  Constitutional:       General: She is not in acute distress  Appearance: She is ill-appearing  Comments: thin   HENT:      Head: Normocephalic and atraumatic  Right Ear: Tympanic membrane, ear canal and external ear normal  There is no impacted cerumen  Left Ear: Tympanic membrane, ear canal and external ear normal  There is no impacted cerumen  Nose: Nose normal  No congestion  Mouth/Throat:      Mouth: Mucous membranes are moist       Pharynx: Oropharynx is clear  No pharyngeal swelling, oropharyngeal exudate or posterior oropharyngeal erythema  Comments: Postnasal drip  Eyes:      Extraocular Movements: Extraocular movements intact  Conjunctiva/sclera: Conjunctivae normal       Pupils: Pupils are equal, round, and reactive to light  Cardiovascular:      Rate and Rhythm: Normal rate and regular rhythm        Heart sounds: Normal heart " sounds  No murmur heard  No friction rub  No gallop  Pulmonary:      Effort: Pulmonary effort is normal  No respiratory distress  Breath sounds: Normal breath sounds  No wheezing  Abdominal:      General: Abdomen is flat  Bowel sounds are normal       Palpations: Abdomen is soft  Tenderness: There is generalized abdominal tenderness  Musculoskeletal:         General: Normal range of motion  Cervical back: Normal range of motion and neck supple  Lymphadenopathy:      Cervical: No cervical adenopathy  Skin:     General: Skin is warm and dry  Findings: Rash (speckled appearance right side of abdomen) present  No erythema  Neurological:      General: No focal deficit present  Mental Status: She is alert and oriented to person, place, and time  Mental status is at baseline  Motor: Weakness present  Comments: Very thin musculature and weak upper extremities   Psychiatric:         Mood and Affect: Mood normal          Behavior: Behavior normal          Thought Content:  Thought content normal          Judgment: Judgment normal

## 2023-05-04 NOTE — LETTER
May 4, 2023     Patient: Stephanie Kolb  YOB: 2000  Date of Visit: 5/4/2023      To Whom it May Concern:     Stephanie Kolb is under my professional care  Ruth Mcdaniels was seen in my office on 5/4/2023  Ruth Mcdaniels may return to work on 5/9/23 or sooner if feeling better  If you have any questions or concerns, please don't hesitate to call           Sincerely,          Rudy Ulloa DO        CC: No Recipients

## 2023-05-05 ENCOUNTER — TELEPHONE (OUTPATIENT)
Dept: FAMILY MEDICINE CLINIC | Facility: CLINIC | Age: 23
End: 2023-05-05

## 2023-05-05 DIAGNOSIS — R10.12 LEFT UPPER QUADRANT ABDOMINAL PAIN: Primary | ICD-10-CM

## 2023-05-05 DIAGNOSIS — R53.1 WEAKNESS: ICD-10-CM

## 2023-05-05 DIAGNOSIS — R21 RASH: ICD-10-CM

## 2023-05-05 LAB
ALBUMIN SERPL-MCNC: 4 G/DL (ref 3.6–5.1)
ALBUMIN/GLOB SERPL: 1.3 (CALC) (ref 1–2.5)
ALP SERPL-CCNC: 77 U/L (ref 31–125)
ALT SERPL-CCNC: 9 U/L (ref 6–29)
AST SERPL-CCNC: 19 U/L (ref 10–30)
BILIRUB SERPL-MCNC: 1 MG/DL (ref 0.2–1.2)
BUN SERPL-MCNC: 15 MG/DL (ref 7–25)
BUN/CREAT SERPL: ABNORMAL (CALC) (ref 6–22)
CALCIUM SERPL-MCNC: 8.8 MG/DL (ref 8.6–10.2)
CHLORIDE SERPL-SCNC: 96 MMOL/L (ref 98–110)
CO2 SERPL-SCNC: 22 MMOL/L (ref 20–32)
CREAT SERPL-MCNC: 0.83 MG/DL (ref 0.5–0.96)
GFR/BSA.PRED SERPLBLD CYS-BASED-ARV: 103 ML/MIN/1.73M2
GLOBULIN SER CALC-MCNC: 3.2 G/DL (CALC) (ref 1.9–3.7)
GLUCOSE SERPL-MCNC: 75 MG/DL (ref 65–139)
POTASSIUM SERPL-SCNC: 4.1 MMOL/L (ref 3.5–5.3)
PROT SERPL-MCNC: 7.2 G/DL (ref 6.1–8.1)
SODIUM SERPL-SCNC: 130 MMOL/L (ref 135–146)

## 2023-05-06 LAB
ALBUMIN SERPL-MCNC: 3.8 G/DL (ref 3.6–5.1)
ALBUMIN/GLOB SERPL: 1 (CALC) (ref 1–2.5)
ALP SERPL-CCNC: 68 U/L (ref 31–125)
ALT SERPL-CCNC: 8 U/L (ref 6–29)
AST SERPL-CCNC: 20 U/L (ref 10–30)
BASOPHILS # BLD AUTO: 20 CELLS/UL (ref 0–200)
BASOPHILS NFR BLD AUTO: 0.4 %
BILIRUB SERPL-MCNC: 0.7 MG/DL (ref 0.2–1.2)
BUN SERPL-MCNC: 12 MG/DL (ref 7–25)
BUN/CREAT SERPL: ABNORMAL (CALC) (ref 6–22)
CALCIUM SERPL-MCNC: 8.9 MG/DL (ref 8.6–10.2)
CHLORIDE SERPL-SCNC: 97 MMOL/L (ref 98–110)
CO2 SERPL-SCNC: 26 MMOL/L (ref 20–32)
CREAT SERPL-MCNC: 0.6 MG/DL (ref 0.5–0.96)
EOSINOPHIL # BLD AUTO: 10 CELLS/UL (ref 15–500)
EOSINOPHIL NFR BLD AUTO: 0.2 %
ERYTHROCYTE [DISTWIDTH] IN BLOOD BY AUTOMATED COUNT: 12.3 % (ref 11–15)
GFR/BSA.PRED SERPLBLD CYS-BASED-ARV: 132 ML/MIN/1.73M2
GLOBULIN SER CALC-MCNC: 3.7 G/DL (CALC) (ref 1.9–3.7)
GLUCOSE SERPL-MCNC: 102 MG/DL (ref 65–139)
HCT VFR BLD AUTO: 41.3 % (ref 35–45)
HGB BLD-MCNC: 14.1 G/DL (ref 11.7–15.5)
LIPASE SERPL-CCNC: 27 U/L (ref 7–60)
LYMPHOCYTES # BLD AUTO: 603 CELLS/UL (ref 850–3900)
LYMPHOCYTES NFR BLD AUTO: 12.3 %
MCH RBC QN AUTO: 27.1 PG (ref 27–33)
MCHC RBC AUTO-ENTMCNC: 34.1 G/DL (ref 32–36)
MCV RBC AUTO: 79.4 FL (ref 80–100)
MONOCYTES # BLD AUTO: 368 CELLS/UL (ref 200–950)
MONOCYTES NFR BLD AUTO: 7.5 %
NEUTROPHILS # BLD AUTO: 3900 CELLS/UL (ref 1500–7800)
NEUTROPHILS NFR BLD AUTO: 79.6 %
PLATELET # BLD AUTO: 155 THOUSAND/UL (ref 140–400)
PMV BLD REES-ECKER: 10.3 FL (ref 7.5–12.5)
POTASSIUM SERPL-SCNC: 3.6 MMOL/L (ref 3.5–5.3)
PROT SERPL-MCNC: 7.5 G/DL (ref 6.1–8.1)
RBC # BLD AUTO: 5.2 MILLION/UL (ref 3.8–5.1)
RHEUMATOID FACT SERPL-ACNC: <14 IU/ML
SODIUM SERPL-SCNC: 133 MMOL/L (ref 135–146)
WBC # BLD AUTO: 4.9 THOUSAND/UL (ref 3.8–10.8)

## 2023-05-08 LAB
ALBUMIN SERPL-MCNC: 3.8 G/DL (ref 3.6–5.1)
ALBUMIN/GLOB SERPL: 1 (CALC) (ref 1–2.5)
ALP SERPL-CCNC: 68 U/L (ref 31–125)
ALT SERPL-CCNC: 8 U/L (ref 6–29)
ANA SER QL IF: NEGATIVE
AST SERPL-CCNC: 20 U/L (ref 10–30)
BASOPHILS # BLD AUTO: 20 CELLS/UL (ref 0–200)
BASOPHILS NFR BLD AUTO: 0.4 %
BILIRUB SERPL-MCNC: 0.7 MG/DL (ref 0.2–1.2)
BUN SERPL-MCNC: 12 MG/DL (ref 7–25)
BUN/CREAT SERPL: ABNORMAL (CALC) (ref 6–22)
CALCIUM SERPL-MCNC: 8.9 MG/DL (ref 8.6–10.2)
CHLORIDE SERPL-SCNC: 97 MMOL/L (ref 98–110)
CO2 SERPL-SCNC: 26 MMOL/L (ref 20–32)
CREAT SERPL-MCNC: 0.6 MG/DL (ref 0.5–0.96)
EOSINOPHIL # BLD AUTO: 10 CELLS/UL (ref 15–500)
EOSINOPHIL NFR BLD AUTO: 0.2 %
ERYTHROCYTE [DISTWIDTH] IN BLOOD BY AUTOMATED COUNT: 12.3 % (ref 11–15)
GFR/BSA.PRED SERPLBLD CYS-BASED-ARV: 132 ML/MIN/1.73M2
GLOBULIN SER CALC-MCNC: 3.7 G/DL (CALC) (ref 1.9–3.7)
GLUCOSE SERPL-MCNC: 102 MG/DL (ref 65–139)
HCT VFR BLD AUTO: 41.3 % (ref 35–45)
HGB BLD-MCNC: 14.1 G/DL (ref 11.7–15.5)
LIPASE SERPL-CCNC: 27 U/L (ref 7–60)
LYMPHOCYTES # BLD AUTO: 603 CELLS/UL (ref 850–3900)
LYMPHOCYTES NFR BLD AUTO: 12.3 %
MCH RBC QN AUTO: 27.1 PG (ref 27–33)
MCHC RBC AUTO-ENTMCNC: 34.1 G/DL (ref 32–36)
MCV RBC AUTO: 79.4 FL (ref 80–100)
MONOCYTES # BLD AUTO: 368 CELLS/UL (ref 200–950)
MONOCYTES NFR BLD AUTO: 7.5 %
NEUTROPHILS # BLD AUTO: 3900 CELLS/UL (ref 1500–7800)
NEUTROPHILS NFR BLD AUTO: 79.6 %
PLATELET # BLD AUTO: 155 THOUSAND/UL (ref 140–400)
PMV BLD REES-ECKER: 10.3 FL (ref 7.5–12.5)
POTASSIUM SERPL-SCNC: 3.6 MMOL/L (ref 3.5–5.3)
PROT SERPL-MCNC: 7.5 G/DL (ref 6.1–8.1)
RBC # BLD AUTO: 5.2 MILLION/UL (ref 3.8–5.1)
RHEUMATOID FACT SERPL-ACNC: <14 IU/ML
SODIUM SERPL-SCNC: 133 MMOL/L (ref 135–146)
WBC # BLD AUTO: 4.9 THOUSAND/UL (ref 3.8–10.8)

## 2023-05-12 LAB — CALPROTECTIN STL-MCNT: 46 MCG/G

## 2023-05-13 DIAGNOSIS — R10.12 LEFT UPPER QUADRANT ABDOMINAL PAIN: Primary | ICD-10-CM

## 2023-05-15 ENCOUNTER — TELEPHONE (OUTPATIENT)
Dept: FAMILY MEDICINE CLINIC | Facility: CLINIC | Age: 23
End: 2023-05-15

## 2023-05-15 ENCOUNTER — PATIENT OUTREACH (OUTPATIENT)
Age: 23
End: 2023-05-15

## 2023-05-15 NOTE — PROGRESS NOTES
Patient was referred to care management in order to help her gain weight  She has had multiple studies done which have found nothing for consistent abdominal pain  She is very underweight which is a concern  I called patient and her mom answered in order to set initial outreach   First available date/time based on my schedule and patients is 5/25

## 2023-05-15 NOTE — TELEPHONE ENCOUNTER
Patient's mother called and stated that [facility] didn't receive the MRI Order  I faxed it over to [place] and also provided Emily with the phone number to [facility] Scheduling

## 2023-05-16 RX ORDER — FLUTICASONE PROPIONATE 50 MCG
SPRAY, SUSPENSION (ML) NASAL
COMMUNITY
Start: 2023-05-08 | End: 2023-05-19 | Stop reason: ALTCHOICE

## 2023-05-16 RX ORDER — FLUCONAZOLE 150 MG/1
TABLET ORAL
COMMUNITY
Start: 2023-05-08 | End: 2023-05-19 | Stop reason: ALTCHOICE

## 2023-05-16 NOTE — PROGRESS NOTES
Assessment/Plan:    No problem-specific Assessment & Plan notes found for this encounter  Diagnoses and all orders for this visit:    Left upper quadrant abdominal pain    Anxiety  -     escitalopram (Lexapro) 10 mg tablet; Take 1 tablet (10 mg total) by mouth daily  -     ALPRAZolam (XANAX) 0 5 mg tablet; Take 1 tablet (0 5 mg total) by mouth 3 (three) times a day as needed for anxiety for up to 14 days    Underweight    Other orders  -     Discontinue: fluticasone (FLONASE) 50 mcg/act nasal spray; SPRAY 1 SPRAY INTO EACH NOSTRIL EVERY DAY (Patient not taking: Reported on 5/19/2023)  -     Discontinue: fluconazole (DIFLUCAN) 150 mg tablet; TAKE 1 TAB BY MOUTH TODAY AND THEN 1 TAB BY MOUTH AT THE END OF STEROID TREATMENT (Patient not taking: Reported on 5/19/2023)          Patient is a 51-year-old female presenting today with primary complaint of anxiety  MAGUE screen was 9  I discussed with patient and her family regarding treatment for anxiety  I will start her on Lexapro 10 mg daily  I discussed side effects in detail with the patient and that she has to take the medication every day  I also discussed that she may need short-term treatment as needed for anxiety  I will prescribe her a one-time prescription of Xanax for 14 days  Prescription is at 0 5 mg 3 times a day as needed  I advised that it takes about 2 weeks for the long-term medicine to take full effect and she should use the Xanax until then  I will follow-up with patient in 6 weeks  If her symptoms have resolved then her abdominal complaints are anxiety related  40 minutes spent on physical exam and plan of care  This note was dictated using software  Subjective:      Patient ID: Chi Bertrand is a 23 y o  female      HPI    The following portions of the patient's history were reviewed and updated as appropriate: allergies, current medications, past family history, past medical history, past social history, past surgical history and "problem list     Patient is a 71-year-old female presenting for follow-up today  We have been in the process of working her up for abdominal pain however her studies have all come back negative at this time  Her specialist recommended treatment for anxiety  Patient does have anxiety around going to work and is fearful about things happening at her job  MAGUE screen today was a 9  She does feel like she is very anxious and her family also states this  They are in the process of getting her in with a counselor  Patient would like to start medication today  Review of Systems   Respiratory: Negative for shortness of breath  Cardiovascular: Negative for chest pain  Gastrointestinal: Positive for abdominal pain  Negative for constipation and diarrhea  Skin: Negative for rash  Psychiatric/Behavioral: The patient is nervous/anxious  Objective:      /60 (BP Location: Left arm, Patient Position: Sitting, Cuff Size: Standard)   Pulse 80   Temp (!) 97 3 °F (36 3 °C)   Ht 5' 2\" (1 575 m)   Wt 45 3 kg (99 lb 12 8 oz)   SpO2 98%   BMI 18 25 kg/m²          Physical Exam  Vitals and nursing note reviewed  Constitutional:       General: She is not in acute distress  Appearance: Normal appearance  HENT:      Head: Normocephalic and atraumatic  Right Ear: External ear normal       Left Ear: External ear normal       Nose: Nose normal       Mouth/Throat:      Mouth: Mucous membranes are moist       Pharynx: Oropharynx is clear  Eyes:      Extraocular Movements: Extraocular movements intact  Conjunctiva/sclera: Conjunctivae normal       Pupils: Pupils are equal, round, and reactive to light  Cardiovascular:      Rate and Rhythm: Normal rate and regular rhythm  Heart sounds: Normal heart sounds  No murmur heard  No friction rub  No gallop  Pulmonary:      Effort: Pulmonary effort is normal  No respiratory distress  Breath sounds: Normal breath sounds   No " wheezing  Musculoskeletal:         General: Normal range of motion  Cervical back: Normal range of motion  Skin:     General: Skin is warm and dry  Findings: No erythema or rash  Neurological:      General: No focal deficit present  Mental Status: She is alert and oriented to person, place, and time  Mental status is at baseline  Psychiatric:         Mood and Affect: Mood normal          Behavior: Behavior normal          Thought Content:  Thought content normal          Judgment: Judgment normal

## 2023-05-19 ENCOUNTER — OFFICE VISIT (OUTPATIENT)
Dept: FAMILY MEDICINE CLINIC | Facility: CLINIC | Age: 23
End: 2023-05-19

## 2023-05-19 VITALS
BODY MASS INDEX: 18.37 KG/M2 | HEIGHT: 62 IN | SYSTOLIC BLOOD PRESSURE: 107 MMHG | HEART RATE: 80 BPM | DIASTOLIC BLOOD PRESSURE: 60 MMHG | TEMPERATURE: 97.3 F | WEIGHT: 99.8 LBS | OXYGEN SATURATION: 98 %

## 2023-05-19 DIAGNOSIS — R63.6 UNDERWEIGHT: ICD-10-CM

## 2023-05-19 DIAGNOSIS — F41.9 ANXIETY: ICD-10-CM

## 2023-05-19 DIAGNOSIS — R10.12 LEFT UPPER QUADRANT ABDOMINAL PAIN: Primary | ICD-10-CM

## 2023-05-19 RX ORDER — ALPRAZOLAM 0.5 MG/1
0.5 TABLET ORAL 3 TIMES DAILY PRN
Qty: 42 TABLET | Refills: 0 | Status: SHIPPED | OUTPATIENT
Start: 2023-05-19 | End: 2023-06-02

## 2023-05-19 RX ORDER — ESCITALOPRAM OXALATE 10 MG/1
10 TABLET ORAL DAILY
Qty: 60 TABLET | Refills: 0 | Status: SHIPPED | OUTPATIENT
Start: 2023-05-19

## 2023-05-19 NOTE — LETTER
May 19, 2023     Patient: Brayden Magaña  YOB: 2000  Date of Visit: 5/19/2023      To Whom it May Concern:    Brayden Magaña is under my professional care  Petr Calista was seen in my office on 5/19/2023  Petremily Grigsby may return to work on 5/20/23  If you have any questions or concerns, please don't hesitate to call           Sincerely,          Jg Tavares, DO        CC: No Recipients

## 2023-05-25 ENCOUNTER — PATIENT OUTREACH (OUTPATIENT)
Dept: FAMILY MEDICINE CLINIC | Facility: CLINIC | Age: 23
End: 2023-05-25

## 2023-05-26 NOTE — PROGRESS NOTES
Spoke with patient and her mom during scheduled outreach call  Patient does not have very good eating habits at all  She skips meals and when she does eat it tends to be [employer] or [employer]  Patient is picky about food her mom makes and mom outright refuses to make different foods for the patient  Mom on the phone said that the patient asked to go to the grocery store to get something for dinner she would like and mom said she wouldn't pay for it, but then took her to [employer]  Patient also recently started on medication for depression/anxiety  I asked her to download an susie and track her calories over the next two weeks until we meet in person to review her food diary  I am hoping that I can meet with Romy So to talk about healthier eating habits  MyChart message sent so patient has a way to communicate with me  Patient verbalized understanding of the susie   Next outreach in person 6/8
Maria A Wise)

## 2023-06-23 ENCOUNTER — PATIENT OUTREACH (OUTPATIENT)
Dept: FAMILY MEDICINE CLINIC | Facility: CLINIC | Age: 23
End: 2023-06-23

## 2023-06-23 NOTE — PROGRESS NOTES
Patient cancelled in person outreach on 6/8 and rescheduled for 6/22  Patient no showed appointment on 6/22  Message sent through [facility] letting patient know if they do not reach out to reschedule an outreach by 6/28 their care management case will be closed

## 2023-06-26 ENCOUNTER — PATIENT OUTREACH (OUTPATIENT)
Age: 23
End: 2023-06-26

## 2023-06-26 ENCOUNTER — TELEPHONE (OUTPATIENT)
Dept: ADMINISTRATIVE | Facility: OTHER | Age: 23
End: 2023-06-26

## 2023-06-26 NOTE — PROGRESS NOTES
Patient missed 2 in office appointments, cancelling one and no showing for second one  Mychart message sent to let them know they need to reschedule and only a strange message about being on vacation was received  Mychart letter sent letting patient know that care management case was closed due to inactivity and that if they can commit to participation they can reach out to schedule an appointment

## 2023-06-26 NOTE — TELEPHONE ENCOUNTER
----- Message from Sai Moreno sent at 6/26/2023 11:20 AM EDT -----  Regarding: care gap request  01/04/23 1:48 PM    Hello, our patient attached above HIV completed/performed  Please assist in updating the patient chart by pulling the Care Everywhere (CE) document  The date of service is 10/11/22       Thank you,  Sai Moreno  [id number] [facility]

## 2023-06-26 NOTE — LETTER
Date: 06/26/23    Dear James Gunter,   Due to inactivity your case will be closed with care management  Making changes that improve health take dedication and commitment despite what is happening around us in life  If you find at some point that you are able to make your health a priority, please reach out to schedule an appointment with care management       Sincerely,  Almaz Kim, MSN RN  [phone]  Outpatient Care Manager

## 2023-06-26 NOTE — TELEPHONE ENCOUNTER
Upon review of the In Basket request we were able to locate, review, and update the patient chart as requested for HIV  Any additional questions or concerns should be emailed to the Practice Liaisons via the appropriate education email address, please do not reply via In Basket      Thank you  Ruth Doshi

## 2023-06-27 ENCOUNTER — PATIENT OUTREACH (OUTPATIENT)
Age: 23
End: 2023-06-27

## 2023-06-27 NOTE — PROGRESS NOTES
Multiple messages have gone back and forth between myself and patient over nii regarding her inability to commit to an appointment  I Discharged patient yesterday due to inactivity and having a patient care management waiting list  I did offer patient one more appointment day and time and she is not available  At this point there is nothing I can do and I provided resources on high calorie low density foods with the recommendation to eat at least 1200 calories a day  I also gave her the option of working with a nutritionist and that the provider could send a referral  Patients case remains closed

## 2023-07-08 DIAGNOSIS — F41.9 ANXIETY: ICD-10-CM

## 2023-07-08 RX ORDER — ESCITALOPRAM OXALATE 10 MG/1
TABLET ORAL
Qty: 60 TABLET | Refills: 0 | Status: SHIPPED | OUTPATIENT
Start: 2023-07-08

## 2023-07-26 ENCOUNTER — OFFICE VISIT (OUTPATIENT)
Age: 23
End: 2023-07-26

## 2023-07-26 VITALS
HEIGHT: 62 IN | TEMPERATURE: 98.3 F | OXYGEN SATURATION: 99 % | SYSTOLIC BLOOD PRESSURE: 98 MMHG | DIASTOLIC BLOOD PRESSURE: 50 MMHG | WEIGHT: 100 LBS | HEART RATE: 89 BPM | BODY MASS INDEX: 18.4 KG/M2

## 2023-07-26 DIAGNOSIS — N63.42 SUBAREOLAR MASS OF LEFT BREAST: Primary | ICD-10-CM

## 2023-07-26 PROCEDURE — 99213 OFFICE O/P EST LOW 20 MIN: CPT | Performed by: NURSE PRACTITIONER

## 2023-07-26 NOTE — PROGRESS NOTES
Name: David Dean      : 2000      MRN: [id number]  Encounter Provider: SILVESTRE Galarza  Encounter Date: 2023   Encounter department: [address] [place] [address] WITH [facility]    Assessment & Plan     1. Subareolar mass of left breast  Assessment & Plan:  Patient here with concerns of left breast lump which she reports is "getting bigger ". Patient reports occasional pain and lump. 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 Patient and mother would like ultrasound of breast at this time. Ultrasound ordered today. Orders:  -     US breast left limited (diagnostic); Future; Expected date: 2023           Subjective      Patient here today with mother with concerns of lump under left breast. Patient does have a hx of fibroadenoma in breast. Patient reporting lump in left breast is "getting bigger". She reports some pain in area of lump. She reports the pain comes and goes. Patient Denies discharge of nipple, dimpling of skin, or other changes in breast. Patient reports LMP 2023. Review of Systems   Constitutional: Negative. HENT: Negative. Cardiovascular: Negative. Gastrointestinal: Negative. Genitourinary: Negative. Skin: Negative for color change, pallor, rash and wound. Lump under left breast which "is getting bigger"   Neurological: Negative.         Current Outpatient Medications on File Prior to Visit   Medication Sig   • escitalopram (LEXAPRO) 10 mg tablet TAKE 1 TABLET BY MOUTH EVERY DAY   • ondansetron (ZOFRAN) 4 mg tablet Take 1 tablet (4 mg total) by mouth every 8 (eight) hours as needed for nausea or vomiting   • ALPRAZolam (XANAX) 0.5 mg tablet Take 1 tablet (0.5 mg total) by mouth 3 (three) times a day as needed for anxiety for up to 14 days       Objective     BP 98/50 (BP Location: Left arm, Patient Position: Sitting, Cuff Size: Standard)   Pulse 89   Temp 98.3 °F (36.8 °C)   Ht 5' 2" (1.575 m)   Wt 45.4 kg (100 lb)   SpO2 99%   BMI 18.29 kg/m²     Physical Exam  Vitals and nursing note reviewed. Constitutional:       General: She is not in acute distress. HENT:      Head: Normocephalic and atraumatic. Eyes:      General:         Right eye: No discharge. Left eye: No discharge. Conjunctiva/sclera: Conjunctivae normal.   Cardiovascular:      Rate and Rhythm: Normal rate and regular rhythm. Heart sounds: Normal heart sounds. Pulmonary:      Effort: Pulmonary effort is normal.      Breath sounds: Normal breath sounds. Musculoskeletal:      Right lower leg: No edema. Left lower leg: No edema. Skin:     General: Skin is warm and dry. Comments: Well circumscribed ,mobile lump noted in left subareolar area of the left breast. Patient reporting tenderness of palpitation of lump. Lump aprox 1.5 in x 1.5in. No dimpling, orange peel skin, or nipple discharge noted. Neurological:      Mental Status: She is alert and oriented to person, place, and time.    Psychiatric:         Mood and Affect: Mood normal.       SILVESTRE Browning

## 2023-07-26 NOTE — PATIENT INSTRUCTIONS
Breast Self Exam for Women   WHAT YOU NEED TO KNOW:   A BSE is a way to check your breasts for lumps and other changes. Regular BSEs can help you know how your breasts normally look and feel. Most breast lumps or changes are not cancer, but you should always have them checked by a healthcare provider. Your healthcare provider can also watch you do a BSE and can tell you if you are doing your BSE correctly. DISCHARGE INSTRUCTIONS:   Call your doctor if:   You find any lumps or changes in your breasts. You have breast pain or fluid coming from your nipples. You have questions or concerns about your condition or care. Why you should do a BSE:  Breast cancer is the most common type of cancer in women. Even if you have mammograms, you may still want to do a BSE regularly. If you know how your breasts normally feel and look, it may help you know when to contact your healthcare provider. Mammograms can miss some cancers. You may find a lump during a BSE that did not show up on a mammogram.  When you should do a BSE:  If you have periods, you may want to do your BSE 1 week after your period ends. This is the time when your breasts may be the least swollen, lumpy, or tender. You can do regular BSEs even if you are breastfeeding or have breast implants. How to do a BSE:       Look at your breasts in a mirror. Look at the size and shape of each breast and nipple. Check for swelling, lumps, dimpling, scaly skin, or other skin changes. Look for nipple changes, such as a nipple that is painful or beginning to pull inward. Gently squeeze both nipples and check to see if fluid (that is not breast milk) comes out of them. If you find any of these or other breast changes, contact your healthcare provider. Check your breasts while you sit or  the following 3 positions:    [place] your arms down at your sides. Raise your hands and join them behind your head. Put firm pressure with your hands on your hips.  [place] slightly forward while you look at your breasts in the mirror. Lie down and feel your breasts. When you lie down, your breast tissue spreads out evenly over your chest. This makes it easier for you to feel for lumps and anything that may not be normal for your breasts. Do a BSE on one breast at a time. Place a small pillow or towel under your left shoulder. Put your left arm behind your head. Use the 3 middle fingers of your right hand. Use your fingertip pads, on the top of your fingers. Your fingertip pad is the most sensitive part of your finger. Use small circles to feel your breast tissue. Use your fingertip pads to make dime-sized, overlapping circles on your breast and armpits. Use light, medium, and firm pressure. First, press lightly. Second, press with medium pressure to feel a little deeper into the breast. Last, use firm pressure to feel deep within your breast.    Examine your entire breast area. Examine the breast area from above the breast to below the breast where you feel only ribs. Make small circles with your fingertips, starting in the middle of your armpit. Make circles going up and down the breast area. Continue toward your breast and all the way across it. Examine the area from your armpit all the way over to the middle of your chest (breastbone). Stop at the middle of your chest.    Move the pillow or towel to your right shoulder, and put your right arm behind your head. Use the 3 fingertip pads of your left hand, and repeat the above steps to do a BSE on your right breast.  What else you can do to check for breast problems or cancer:  Talk to your healthcare provider about mammograms. A mammogram is an x-ray of your breasts to screen for breast cancer or other problems. Your provider can tell you the benefits and risks of mammograms. The first mammogram is usually at age 39 or 48. Your provider may recommend you start at 36 or younger if your risk for breast cancer is high. Mammograms usually continue every 1 to 2 years until age 76. Follow up with your doctor as directed:  Write down your questions so you remember to ask them during your visits. © Copyright Juan Garcia 2022 Information is for End User's use only and may not be sold, redistributed or otherwise used for commercial purposes. The above information is an  only. It is not intended as medical advice for individual conditions or treatments. Talk to your doctor, nurse or pharmacist before following any medical regimen to see if it is safe and effective for you.

## 2023-07-26 NOTE — ASSESSMENT & PLAN NOTE
Patient here with concerns of left breast lump which she reports is "getting bigger ". Patient reports occasional pain and lump. 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 Patient and mother would like ultrasound of breast at this time. Ultrasound ordered today.

## 2023-07-28 PROBLEM — F41.9 ANXIETY: Status: ACTIVE | Noted: 2023-07-28

## 2023-07-28 RX ORDER — ESCITALOPRAM OXALATE 10 MG/1
10 TABLET ORAL DAILY
Qty: 90 TABLET | Refills: 3 | Status: CANCELLED | OUTPATIENT
Start: 2023-07-28

## 2023-07-28 NOTE — PROGRESS NOTES
Assessment/Plan:    No problem-specific Assessment & Plan notes found for this encounter. Diagnoses and all orders for this visit:    Anxiety  -     escitalopram (LEXAPRO) 20 mg tablet; Take 1 tablet (20 mg total) by mouth daily    Left upper quadrant abdominal pain  -     Lactobacillus (Acidophilus Extra Strength) CAPS; Take 1 capsule by mouth in the morning    Splenomegaly    Mass of left breast, unspecified quadrant          Patient is a 21-year-old female who is presenting for follow-up today on anxiety treatment. After discussion with her she has decided to increase her Lexapro dose and see how she feels with the change in the medication. She also has continued to have the left upper quadrant abdominal pain. She has had a near full work-up at this point with no abnormal findings. We did discuss going to GI for scope versus monitoring. Her abdominal pain is likely functional at her age and I will print her out information on FODMAPs as this is likely IBS. She does not report any constipation or diarrhea before the pain however. I will also prescribe her an extra strength lactobacillus tablet to start in the morning. I will follow-up with patient in 3 months for her annual checkup at that time. She should let us know if her symptoms on the Lexapro change in the interim. 35 minutes spent on precharting, documentation, physical exam and plan of care. This note was dictated using software. Subjective:      Patient ID: Katerina Harry is a 26 y.o. female. HPI    The following portions of the patient's history were reviewed and updated as appropriate: allergies, current medications, past family history, past medical history, past social history, past surgical history and problem list.    Patient is a 21-year-old female who is presenting for follow-up today after being placed on medication for anxiety. She has been having chronic symptoms of upper quadrant pain, general feeling of illness.   We did discuss her anxiety that revolves around work. After discussion I placed her on Xanax temporarily as needed while having her on Lexapro 10 mg daily. She reports that she has had improvement in anxiety. She still feels anxious and emotional at times. She was recently seen for an acute visit by our nurse practitioner and reported a size change in her previous breast mass. Previous ultrasound was consistent with fibroadenoma. She was sent for repeat ultrasound. She continues to have some of the abdominal pain. She has had a full work-up excluding a scope and is not interested in doing so at this time. Her pain is likely a functional abdominal pain. Review of Systems   Respiratory: Negative for shortness of breath. Cardiovascular: Negative for chest pain. Gastrointestinal: Positive for abdominal pain. Negative for constipation, diarrhea, nausea and vomiting. Genitourinary: Negative for difficulty urinating. Skin: Negative for rash. Psychiatric/Behavioral: The patient is nervous/anxious. Objective:      /60 (BP Location: Right arm, Patient Position: Sitting, Cuff Size: Standard)   Pulse 97   Ht 5' 2" (1.575 m)   Wt 47.2 kg (104 lb)   SpO2 98%   BMI 19.02 kg/m²          Physical Exam  Vitals and nursing note reviewed. Constitutional:       General: She is not in acute distress. Appearance: Normal appearance. HENT:      Head: Normocephalic and atraumatic. Right Ear: External ear normal.      Left Ear: External ear normal.      Nose: Nose normal.      Mouth/Throat:      Mouth: Mucous membranes are moist.      Pharynx: Oropharynx is clear. Eyes:      Extraocular Movements: Extraocular movements intact. Conjunctiva/sclera: Conjunctivae normal.      Pupils: Pupils are equal, round, and reactive to light. Cardiovascular:      Rate and Rhythm: Normal rate and regular rhythm. Heart sounds: Normal heart sounds. No murmur heard. No friction rub.  No gallop. Pulmonary:      Effort: Pulmonary effort is normal. No respiratory distress. Breath sounds: Normal breath sounds. No wheezing. Abdominal:      General: Abdomen is flat. Palpations: Abdomen is soft. Tenderness: There is no abdominal tenderness. There is no guarding. Musculoskeletal:         General: Normal range of motion. Cervical back: Normal range of motion. Skin:     General: Skin is warm and dry. Findings: No erythema or rash. Neurological:      General: No focal deficit present. Mental Status: She is alert and oriented to person, place, and time. Mental status is at baseline. Psychiatric:         Mood and Affect: Mood normal.         Behavior: Behavior normal.         Thought Content:  Thought content normal.         Judgment: Judgment normal.

## 2023-08-07 ENCOUNTER — OFFICE VISIT (OUTPATIENT)
Age: 23
End: 2023-08-07

## 2023-08-07 VITALS
DIASTOLIC BLOOD PRESSURE: 60 MMHG | OXYGEN SATURATION: 98 % | SYSTOLIC BLOOD PRESSURE: 100 MMHG | HEART RATE: 97 BPM | BODY MASS INDEX: 19.14 KG/M2 | WEIGHT: 104 LBS | HEIGHT: 62 IN

## 2023-08-07 DIAGNOSIS — N63.20 MASS OF LEFT BREAST, UNSPECIFIED QUADRANT: ICD-10-CM

## 2023-08-07 DIAGNOSIS — R10.12 LEFT UPPER QUADRANT ABDOMINAL PAIN: ICD-10-CM

## 2023-08-07 DIAGNOSIS — R16.1 SPLENOMEGALY: ICD-10-CM

## 2023-08-07 DIAGNOSIS — F41.9 ANXIETY: Primary | ICD-10-CM

## 2023-08-07 PROCEDURE — 99214 OFFICE O/P EST MOD 30 MIN: CPT | Performed by: STUDENT IN AN ORGANIZED HEALTH CARE EDUCATION/TRAINING PROGRAM

## 2023-08-07 RX ORDER — ESCITALOPRAM OXALATE 20 MG/1
20 TABLET ORAL DAILY
Qty: 90 TABLET | Refills: 0 | Status: SHIPPED | OUTPATIENT
Start: 2023-08-07

## 2023-08-07 RX ORDER — GUAIFENESIN 400 MG
1 TABLET ORAL DAILY
Qty: 90 CAPSULE | Refills: 3 | Status: SHIPPED | OUTPATIENT
Start: 2023-08-07

## 2023-08-07 NOTE — LETTER
August 7, 2023     Patient: Samra Chun  YOB: 2000  Date of Visit: 8/7/2023      To Whom it May Concern:    Samra Chun is under my professional care. Lane Renner was seen in my office on 8/7/2023. Lane Renner may return to work on 8/7/23 . If you have any questions or concerns, please don't hesitate to call.          Sincerely,          Cheri Choi,         CC: No Recipients

## 2023-08-07 NOTE — LETTER
August 7, 2023     Patient: Danny Townsend  YOB: 2000  Date of Visit: 8/7/2023      To Whom it May Concern:    Danny Townsend is under my professional care. Mo Brooke was seen in my office on 8/7/2023. Mo Brooke may return to work on 8/7/23 . If you have any questions or concerns, please don't hesitate to call.          Sincerely,          John Coburn, DO        CC: No Recipients

## 2023-08-10 ENCOUNTER — TELEMEDICINE (OUTPATIENT)
Dept: FAMILY MEDICINE CLINIC | Facility: CLINIC | Age: 23
End: 2023-08-10

## 2023-08-10 DIAGNOSIS — R11.0 NAUSEA: ICD-10-CM

## 2023-08-10 DIAGNOSIS — R51.9 ACUTE NONINTRACTABLE HEADACHE, UNSPECIFIED HEADACHE TYPE: Primary | ICD-10-CM

## 2023-08-10 PROCEDURE — 99213 OFFICE O/P EST LOW 20 MIN: CPT | Performed by: NURSE PRACTITIONER

## 2023-08-10 NOTE — LETTER
August 10, 2023     Patient: Katerina Harry  YOB: 2000  Date of Visit: 8/10/2023      To Whom it May Concern:    Katerina Harry is under my professional care. Vivi Shah was seen in my office on 8/10/2023. Vivi Shah may return to work on 08/14/2023 . If you have any questions or concerns, please don't hesitate to call.          Sincerely,          SILVESTRE Ceron        CC: No Recipients

## 2023-08-10 NOTE — ASSESSMENT & PLAN NOTE
May eat bland diet as tolerated. May take Zofran as needed. Patient advised to follow-up in office if symptoms worsen or do not improve.

## 2023-08-10 NOTE — ASSESSMENT & PLAN NOTE
Patient requesting work note due to headache and nausea. She reports sx started last night, and she has tried Tylenol with no relief. Patient advised to use flonase and alternate between Tylenol and Ibuprofen, and take Zofran as needed for nausea. Patient given work excuse. Discussed importance of regular eating and drinking habits. Patient verbalized understanding. Patient should return to office if sx do not improve.

## 2023-08-10 NOTE — PROGRESS NOTES
Name: Rob Navarrete      : 2000      MRN: [id number]  Encounter Provider: SLIVESTRE Adan  Encounter Date: 8/10/2023   Encounter department: [address] [place] [address]     1. Acute nonintractable headache, unspecified headache type  Assessment & Plan:  Patient requesting work note due to headache and nausea. She reports sx started last night, and she has tried Tylenol with no relief. Patient advised to use flonase and alternate between Tylenol and Ibuprofen, and take Zofran as needed for nausea. Patient given work excuse. Discussed importance of regular eating and drinking habits. Patient verbalized understanding. Patient should return to office if sx do not improve. 2. Nausea  Assessment & Plan:  May eat bland diet as tolerated. May take Zofran as needed. Patient advised to follow-up in office if symptoms worsen or do not improve. Subjective      Virtual Brief Visit    This Visit is being completed by telephone. The Patient is located at Home and in the following state in which I hold an active license [place]    The patient was identified by name and date of birth. Rob Navarrete was informed that this is a telemedicine visit and that the visit is being conducted through the [employer]. She agrees to proceed. .  My office door was closed. No one else was in the room. She acknowledged consent and understanding of privacy and security of the video platform. The patient has agreed to participate and understands they can discontinue the visit at any time. Patient is aware this is a billable service. Assessment/Plan:    Problem List Items Addressed This Visit    None    This SmartLink is not supported for plain text fields. Patient reporting headache and nausea which started last night. She reports she tested negative for COVID. Patient reports headache is a 6 out of 10 frontal headache. She states she has had a runny nose, but denies postnasal drip, sore throat, light sensitivity, sensitivity to noise, aura, diarrhea, constipation, stomach pain. Patient reports she has had 2 snacks today but has not eaten since last night. Patient denies vomiting. Patient is concerned because she works at a  and would like a note for work. Visit Time  Total Visit Duration: 15              Review of Systems   Constitutional: Negative. Negative for fatigue and fever. HENT: Positive for rhinorrhea. Negative for congestion, dental problem, drooling, ear discharge, ear pain, facial swelling, hearing loss, mouth sores, nosebleeds, postnasal drip, sinus pressure, sinus pain, sneezing, sore throat, tinnitus, trouble swallowing and voice change. Eyes: Negative. Respiratory: Negative. Cardiovascular: Negative. Gastrointestinal: Positive for nausea. Negative for abdominal distention, abdominal pain, anal bleeding, blood in stool, constipation, diarrhea, rectal pain and vomiting. Genitourinary: Negative. Musculoskeletal: Negative. Skin: Negative. Neurological: Negative. Current Outpatient Medications on File Prior to Visit   Medication Sig   • escitalopram (LEXAPRO) 20 mg tablet Take 1 tablet (20 mg total) by mouth daily   • Lactobacillus (Acidophilus Extra Strength) CAPS Take 1 capsule by mouth in the morning   • ondansetron (ZOFRAN) 4 mg tablet Take 1 tablet (4 mg total) by mouth every 8 (eight) hours as needed for nausea or vomiting       Objective     There were no vitals taken for this visit. Physical Exam  Constitutional:       General: She is not in acute distress. Appearance: Normal appearance. She is not ill-appearing, toxic-appearing or diaphoretic. HENT:      Head: Atraumatic. Nose: Nose normal.   Eyes:      General:         Right eye: No discharge. Left eye: No discharge.       Conjunctiva/sclera: Conjunctivae normal.   Pulmonary:      Effort: No respiratory distress. Skin:     Coloration: Skin is not jaundiced. Neurological:      Mental Status: She is alert and oriented to person, place, and time. Psychiatric:         Mood and Affect: Mood normal.         Behavior: Behavior normal.         Thought Content:  Thought content normal.         Judgment: Judgment normal.       SILVESTRE Brandt

## 2023-08-10 NOTE — PATIENT INSTRUCTIONS
Acute Headache   WHAT YOU NEED TO KNOW:   An acute headache is pain or discomfort that may start suddenly and get worse quickly. You may have an acute headache only when you feel stress or eat certain foods. Other acute headache pain can happen every day, and sometimes several times a day. DISCHARGE INSTRUCTIONS:   Return to the emergency department if:   You have severe pain. You have numbness or weakness on one side of your face or body. You have a headache that occurs after a blow to the head, a fall, or other trauma. You have a headache, are forgetful or confused, or have trouble speaking. You have a headache, stiff neck, and a fever. Call your doctor if:   You have a constant headache and are vomiting. You have a headache each day that does not get better, even after treatment. You have changes in your headaches, or new symptoms that occur when you have a headache. You have questions or concerns about your condition or care. Medicines: You may need any of the following:  Prescription pain medicine  may be given. The medicine your healthcare provider recommends will depend on the kind of headaches you have. You will need to take prescription headache medicines as directed to prevent a problem called rebound headache. These headaches happen with regular use of pain relievers for headache disorders. NSAIDs , such as ibuprofen, help decrease swelling, pain, and fever. This medicine is available with or without a doctor's order. NSAIDs can cause stomach bleeding or kidney problems in certain people. If you take blood thinner medicine, always ask your healthcare provider if NSAIDs are safe for you. Always read the medicine label and follow directions. Acetaminophen  decreases pain and fever. It is available without a doctor's order. Ask how much to take and how often to take it. Follow directions.  Read the labels of all other medicines you are using to see if they also contain acetaminophen, or ask your doctor or pharmacist. Acetaminophen can cause liver damage if not taken correctly. Antidepressants  may be given for some kinds of headaches. Take your medicine as directed. Contact your healthcare provider if you think your medicine is not helping or if you have side effects. Tell your provider if you are allergic to any medicine. Keep a list of the medicines, vitamins, and herbs you take. Include the amounts, and when and why you take them. Bring the list or the pill bottles to follow-up visits. Carry your medicine list with you in case of an emergency. Manage your symptoms:   Apply heat or ice  on the headache area. Use a heat or ice pack. For an ice pack, you can also put crushed ice in a plastic bag. Cover the pack or bag with a towel before you apply it to your skin. Ice and heat both help decrease pain, and heat also helps decrease muscle spasms. Apply heat for 20 to 30 minutes every 2 hours. Apply ice for 15 to 20 minutes every hour. Apply heat or ice for as long and for as many days as directed. You may alternate heat and ice. Relax your muscles. Lie down in a comfortable position and close your eyes. Relax your muscles slowly. Start at your toes and work your way up your body. Keep a record of your headaches. Write down when your headaches start and stop. Include your symptoms and what you were doing when the headache began. Record what you ate or drank for 24 hours before the headache started. Describe the pain and where it hurts. Keep track of what you did to treat your headache and if it worked. Prevent an acute headache:   Avoid anything that triggers an acute headache. Examples include exposure to chemicals, going to high altitude, or not getting enough sleep. Create a regular sleep routine. Go to sleep at the same time and wake up at the same time each day. Do not use electronic devices before bedtime.  These may trigger a headache or prevent you from sleeping well. Do not smoke. Nicotine and other chemicals in cigarettes and cigars can trigger an acute headache or make it worse. Ask your healthcare provider for information if you currently smoke and need help to quit. E-cigarettes or smokeless tobacco still contain nicotine. Talk to your healthcare provider before you use these products. Limit alcohol as directed. Alcohol can trigger an acute headache or make it worse. If you have cluster headaches, do not drink alcohol during an episode. For other types of headaches, ask your healthcare provider if it is safe for you to drink alcohol. Ask how much is safe for you to drink, and how often. Exercise as directed. Exercise can reduce tension and help with headache pain. Aim for 30 minutes of physical activity on most days of the week. Your healthcare provider can help you create an exercise plan. Eat a variety of healthy foods. Healthy foods include fruits, vegetables, low-fat dairy products, lean meats, fish, whole grains, and cooked beans. Your healthcare provider or dietitian can help you create meals plans if you need to avoid foods that trigger headaches. Follow up with your healthcare provider as directed:  Bring your headache record with you when you see your healthcare provider. Write down your questions so you remember to ask them during your visits. © Copyright [facility] 2022 Information is for End User's use only and may not be sold, redistributed or otherwise used for commercial purposes. The above information is an  only. It is not intended as medical advice for individual conditions or treatments. Talk to your doctor, nurse or pharmacist before following any medical regimen to see if it is safe and effective for you.

## 2023-08-28 DIAGNOSIS — N63.42 SUBAREOLAR MASS OF LEFT BREAST: Primary | ICD-10-CM

## 2023-09-13 ENCOUNTER — TELEPHONE (OUTPATIENT)
Dept: FAMILY MEDICINE CLINIC | Facility: CLINIC | Age: 23
End: 2023-09-13

## 2023-09-13 NOTE — TELEPHONE ENCOUNTER
Mom provided fax number for [facility] office to fax over the referral and the test results.      Fax [phone]

## 2023-11-03 NOTE — PROGRESS NOTES
ADULT ANNUAL [facility] IN PARTNERSHIP WITH [facility]    NAME: Salomon Kearney  AGE: 26 y.o. SEX: female  : 2000     DATE: 2023     Assessment and Plan:     Problem List Items Addressed This Visit       Anxiety    Relevant Medications    escitalopram (LEXAPRO) 20 mg tablet    Other Relevant Orders    Ambulatory referral to Psych Services    Acute nonintractable headache     Headache is associated with periods of patient not eating. She also does not hydrate well during the day. Fibroadenoma of left breast     Following with breast surgeon. Noted on biopsy. Other Visit Diagnoses       Annual physical exam    -  Primary    Encounter for vaccination        Flu vaccine declined. Has poorly balanced diet        Relevant Orders    Ambulatory referral to Psych Services    Cervical cancer screening        Relevant Orders    Ambulatory referral to Gynecology            Immunizations and preventive care screenings were discussed with patient today. Appropriate education was printed on patient's after visit summary. Counseling:  Alcohol/drug use: discussed moderation in alcohol intake, the recommendations for healthy alcohol use, and avoidance of illicit drug use. Dental Health: discussed importance of regular tooth brushing, flossing, and dental visits. Exercise: the importance of regular exercise/physical activity was discussed. Recommend exercise 3-5 times per week for at least 30 minutes. Depression Screening and Follow-up Plan: Patient was screened for depression during today's encounter. They screened negative with a PHQ-2 score of 0. Patient was seen today for an annual physical exam. Age appropriate screening tests were done as above. Annual labs were ordered to be done through [facility]. Patient will be contacted regarding results and follow up will be based on findings.  Patient was counseled on the importance of proper diet and exercise. I recommend diets rich in lean meats and leafy green vegetables. Try to have 150 minutes of exercise weekly at moderate intensity. See problem based charting for any chronic problems or new findings and current workup/management. Patient continues to report problems with anxiety, abdominal pain, headache and nausea. She continues to have difficulty opening up to others and states that she just does not feel like eating. A lot of her symptoms are probably because her diet is so poor and she does not adequately get enough food or water intake during the day. She was open to a referral for a psychiatric specialist for counseling and evaluation for possible eating disorder. 30 minutes were spent on chart review, examination, and plan of care. This note was dictated using software. Return in about 3 months (around 2/13/2024) for Recheck. Chief Complaint:     Chief Complaint   Patient presents with    Physical Exam      History of Present Illness:     Adult Annual Physical   Patient here for a comprehensive physical exam. The patient reports  follow up anxiety, abdominal pain, headache . Diet and Physical Activity  Diet/Nutrition:  poor intake, dinner only . Exercise: moderate cardiovascular exercise. Depression Screening  PHQ-2/9 Depression Screening    Little interest or pleasure in doing things: 0 - not at all  Feeling down, depressed, or hopeless: 0 - not at all  PHQ-2 Score: 0  PHQ-2 Interpretation: Negative depression screen       General Health  Sleep: sleeps well. Hearing: normal - bilateral.  Vision: no vision problems. Dental: regular dental visits. /GYN Health  Due for cervical screening       Review of Systems:     Review of Systems   Respiratory:  Negative for shortness of breath. Cardiovascular:  Negative for chest pain. Gastrointestinal:  Positive for abdominal pain.  Negative for constipation, diarrhea, nausea and vomiting. Genitourinary:  Negative for difficulty urinating. Skin:  Negative for rash. Neurological:  Positive for headaches. Psychiatric/Behavioral:  The patient is nervous/anxious. Past Medical History:     Past Medical History:   Diagnosis Date    Enlargement, spleen     Hydronephrosis       Past Surgical History:     Past Surgical History:   Procedure Laterality Date    KIDNEY SURGERY      11 mths old      Social History:     Social History     Socioeconomic History    Marital status: Single     Spouse name: None    Number of children: None    Years of education: None    Highest education level: None   Occupational History    None   Tobacco Use    Smoking status: Never    Smokeless tobacco: Never   Vaping Use    Vaping Use: Never used   Substance and Sexual Activity    Alcohol use: Never    Drug use: Never    Sexual activity: None   Other Topics Concern    None   Social History Narrative    None     Social Determinants of Health     Financial Resource Strain: Not on file   Food Insecurity: Not on file   Transportation Needs: Not on file   Physical Activity: Not on file   Stress: Not on file   Social Connections: Not on file   Intimate Partner Violence: Not on file   Housing Stability: Not on file      Family History:     History reviewed. No pertinent family history. Current Medications:     Current Outpatient Medications   Medication Sig Dispense Refill    escitalopram (LEXAPRO) 20 mg tablet Take 1 tablet (20 mg total) by mouth daily 90 tablet 3    ondansetron (ZOFRAN) 4 mg tablet Take 1 tablet (4 mg total) by mouth every 8 (eight) hours as needed for nausea or vomiting 20 tablet 0     No current facility-administered medications for this visit.       Allergies:     No Known Allergies   Physical Exam:     BP 98/60 (BP Location: Left arm, Patient Position: Sitting, Cuff Size: Standard)   Pulse 69   Temp 98.6 °F (37 °C)   Ht 5' 2" (1.575 m)   Wt 46.1 kg (101 lb 9.6 oz)   SpO2 100% BMI 18.58 kg/m²     Physical Exam  Vitals and nursing note reviewed. Constitutional:       General: She is not in acute distress. Appearance: Normal appearance. She is well-developed. HENT:      Head: Normocephalic and atraumatic. Right Ear: Tympanic membrane, ear canal and external ear normal.      Left Ear: Tympanic membrane, ear canal and external ear normal.      Nose: Nose normal.      Mouth/Throat:      Mouth: Mucous membranes are moist.      Pharynx: No oropharyngeal exudate or posterior oropharyngeal erythema. Eyes:      Extraocular Movements: Extraocular movements intact. Conjunctiva/sclera: Conjunctivae normal.      Pupils: Pupils are equal, round, and reactive to light. Cardiovascular:      Rate and Rhythm: Normal rate and regular rhythm. Heart sounds: Normal heart sounds. No murmur heard. Pulmonary:      Effort: Pulmonary effort is normal. No respiratory distress. Breath sounds: Normal breath sounds. No wheezing. Abdominal:      General: Abdomen is flat. Palpations: Abdomen is soft. Tenderness: There is no abdominal tenderness. There is no guarding. Musculoskeletal:         General: No swelling or tenderness. Normal range of motion. Cervical back: Normal range of motion and neck supple. Lymphadenopathy:      Cervical: No cervical adenopathy. Skin:     General: Skin is warm and dry. Findings: No rash. Neurological:      General: No focal deficit present. Mental Status: She is alert and oriented to person, place, and time. Mental status is at baseline. Psychiatric:         Mood and Affect: Mood normal.         Behavior: Behavior normal.         Thought Content:  Thought content normal.         Judgment: Judgment normal.          [address] WITH [facility]

## 2023-11-06 RX ORDER — AMOXICILLIN AND CLAVULANATE POTASSIUM 875; 125 MG/1; MG/1
TABLET, FILM COATED ORAL
COMMUNITY
Start: 2023-10-26 | End: 2023-11-13 | Stop reason: ALTCHOICE

## 2023-11-06 RX ORDER — METHYLPREDNISOLONE 4 MG/1
TABLET ORAL
COMMUNITY
Start: 2023-08-22 | End: 2023-11-13 | Stop reason: ALTCHOICE

## 2023-11-06 RX ORDER — ONDANSETRON 4 MG/1
TABLET, ORALLY DISINTEGRATING ORAL
COMMUNITY
Start: 2023-08-24 | End: 2023-11-13 | Stop reason: ALTCHOICE

## 2023-11-13 ENCOUNTER — OFFICE VISIT (OUTPATIENT)
Age: 23
End: 2023-11-13

## 2023-11-13 VITALS
HEART RATE: 69 BPM | SYSTOLIC BLOOD PRESSURE: 98 MMHG | HEIGHT: 62 IN | TEMPERATURE: 98.6 F | OXYGEN SATURATION: 100 % | WEIGHT: 101.6 LBS | DIASTOLIC BLOOD PRESSURE: 60 MMHG | BODY MASS INDEX: 18.69 KG/M2

## 2023-11-13 DIAGNOSIS — Z12.4 CERVICAL CANCER SCREENING: ICD-10-CM

## 2023-11-13 DIAGNOSIS — Z23 ENCOUNTER FOR VACCINATION: ICD-10-CM

## 2023-11-13 DIAGNOSIS — Z91.89 HAS POORLY BALANCED DIET: ICD-10-CM

## 2023-11-13 DIAGNOSIS — R51.9 ACUTE NONINTRACTABLE HEADACHE, UNSPECIFIED HEADACHE TYPE: ICD-10-CM

## 2023-11-13 DIAGNOSIS — Z00.00 ANNUAL PHYSICAL EXAM: Primary | ICD-10-CM

## 2023-11-13 DIAGNOSIS — F41.9 ANXIETY: ICD-10-CM

## 2023-11-13 DIAGNOSIS — D24.2 FIBROADENOMA OF LEFT BREAST: ICD-10-CM

## 2023-11-13 PROCEDURE — 99395 PREV VISIT EST AGE 18-39: CPT | Performed by: STUDENT IN AN ORGANIZED HEALTH CARE EDUCATION/TRAINING PROGRAM

## 2023-11-13 PROCEDURE — 99213 OFFICE O/P EST LOW 20 MIN: CPT | Performed by: STUDENT IN AN ORGANIZED HEALTH CARE EDUCATION/TRAINING PROGRAM

## 2023-11-13 RX ORDER — ESCITALOPRAM OXALATE 20 MG/1
20 TABLET ORAL DAILY
Qty: 90 TABLET | Refills: 3 | Status: SHIPPED | OUTPATIENT
Start: 2023-11-13

## 2023-11-13 NOTE — ASSESSMENT & PLAN NOTE
Headache is associated with periods of patient not eating. She also does not hydrate well during the day.

## 2024-01-15 ENCOUNTER — AMB VIDEO VISIT (OUTPATIENT)
Dept: OTHER | Facility: HOSPITAL | Age: 24
End: 2024-01-15

## 2024-01-15 DIAGNOSIS — B34.9 VIRAL ILLNESS: Primary | ICD-10-CM

## 2024-01-15 PROCEDURE — ECARE PR SL URGENT CARE VIRTUAL VISIT: Performed by: PHYSICIAN ASSISTANT

## 2024-01-15 RX ORDER — FLUTICASONE PROPIONATE 50 MCG
1 SPRAY, SUSPENSION (ML) NASAL DAILY
Qty: 15.8 ML | Refills: 0 | Status: SHIPPED | OUTPATIENT
Start: 2024-01-15

## 2024-01-15 RX ORDER — BENZONATATE 100 MG/1
100 CAPSULE ORAL 3 TIMES DAILY PRN
Qty: 20 CAPSULE | Refills: 0 | Status: SHIPPED | OUTPATIENT
Start: 2024-01-15

## 2024-01-15 NOTE — PROGRESS NOTES
Required Documentation:  Encounter provider Lucretia Dumont PA-C    Provider located at [facility]  VIRTUAL CARE   [address] [facility] [id number]    Identify all parties in room with patient during virtual visit:  No one else    The patient was identified by name and date of birth. Celsa Sam was informed that this is a telemedicine visit and that the visit is being conducted through the [employer] platform. She agrees to proceed..  My office door was closed. No one else was in the room.  She acknowledged consent and understanding of privacy and security of the video platform. The patient has agreed to participate and understands they can discontinue the visit at any time.    Verification of patient location:    Patient is located at home in the following state in which I hold an active license [place]    Patient is aware this is a billable service.     Reason for visit is No chief complaint on file.       Subjective  HPI   Patient states that she has a headache, runny nose, cough, ear pain.  She has been sick for 2 days.  She had a negative covid test.  She has not taking anything. She denies Fevers, chills, SOB, CP.      Past Medical History:   Diagnosis Date    Enlargement, spleen     Hydronephrosis        Past Surgical History:   Procedure Laterality Date    KIDNEY SURGERY      5 mths old        No Known Allergies    Review of Systems   Constitutional: Negative.    HENT:  Positive for congestion and rhinorrhea.    Respiratory:  Positive for cough.    Cardiovascular: Negative.    Gastrointestinal: Negative.    Musculoskeletal: Negative.    Neurological:  Positive for headaches.   Psychiatric/Behavioral: Negative.         Video Exam    There were no vitals filed for this visit.    Physical Exam  Constitutional:       General: She is not in acute distress.     Appearance: Normal appearance. She is not ill-appearing, toxic-appearing or diaphoretic.   HENT:       Head: Normocephalic and atraumatic.      Nose: Congestion present.   Pulmonary:      Effort: Pulmonary effort is normal. No respiratory distress.   Skin:     General: Skin is dry.   Neurological:      General: No focal deficit present.      Mental Status: She is alert and oriented to person, place, and time.   Psychiatric:         Mood and Affect: Mood normal.         Behavior: Behavior normal.         Visit Time  Total Visit Duration: 5 minutes    Assessment/Plan:    Diagnoses and all orders for this visit:    Viral illness  -     fluticasone (FLONASE) 50 mcg/act nasal spray; 1 spray into each nostril daily  -     benzonatate (TESSALON PERLES) 100 mg capsule; Take 1 capsule (100 mg total) by mouth 3 (three) times a day as needed for cough        Patient Instructions   Likely viral at this time  Continue over the counter medications   Follow up with PCP  Er if worsen

## 2024-01-15 NOTE — PATIENT INSTRUCTIONS
Likely viral at this time  Continue over the counter medications   Follow up with PCP  Er if worsen

## 2024-01-15 NOTE — CARE ANYWHERE EVISITS
Visit Summary for Celsa Sam - Gender: Female - Date of Birth: 2000  Date: [id number] - Duration: 2 minutes  Patient: Celsa Sam  Provider: Lucretia Dumont PA-C    Patient Contact Information  Address  [address]  [place]; [place] [address]  [phone]    Visit Topics  Headache [Added By: Self - 2024-01-15]  Cold [Added By: Self - 2024-01-15]  Earache [Added By: Self - 2024-01-15]    Triage Questions   What is your current physical address in the event of a medical emergency? Answer []  Are you allergic to any medications? Answer []  Are you now or could you be pregnant? Answer []  Do you have any immune system compromise or chronic lung   disease? Answer []  Do you have any vulnerable family members in the home (infant, pregnant, cancer, elderly)? Answer []     Conversation Transcripts  [0A][0A] [Notification] You are connected with Lucretia Dumont PA-C, Urgent Care Specialist.[0A][Notification] Celsa Sam is located in [place].[0A][Notification] Celsa Sam has shared health history...[0A]    Diagnosis  Viral infection, unspecified    Procedures  Value: [id number] Code: CPT-4 UNLISTED E&M SERVICE    Medications Prescribed    No prescriptions ordered    Electronically signed by: Lucretia Dumont PA-C(NPI [id number])

## 2024-01-19 ENCOUNTER — OFFICE VISIT (OUTPATIENT)
Dept: FAMILY MEDICINE CLINIC | Facility: CLINIC | Age: 24
End: 2024-01-19

## 2024-01-19 VITALS
HEART RATE: 108 BPM | SYSTOLIC BLOOD PRESSURE: 118 MMHG | OXYGEN SATURATION: 97 % | HEIGHT: 62 IN | DIASTOLIC BLOOD PRESSURE: 66 MMHG | BODY MASS INDEX: 18.4 KG/M2 | TEMPERATURE: 98.7 F | WEIGHT: 100 LBS

## 2024-01-19 DIAGNOSIS — J40 BRONCHITIS: Primary | ICD-10-CM

## 2024-01-19 DIAGNOSIS — R05.1 ACUTE COUGH: ICD-10-CM

## 2024-01-19 DIAGNOSIS — R09.81 CONGESTION OF NASAL SINUS: ICD-10-CM

## 2024-01-19 DIAGNOSIS — H92.03 OTALGIA OF BOTH EARS: ICD-10-CM

## 2024-01-19 DIAGNOSIS — R51.9 ACUTE NONINTRACTABLE HEADACHE, UNSPECIFIED HEADACHE TYPE: ICD-10-CM

## 2024-01-19 PROCEDURE — MUCINEX DM 12 HR MUCINEX DM 12 HR: Performed by: STUDENT IN AN ORGANIZED HEALTH CARE EDUCATION/TRAINING PROGRAM

## 2024-01-19 PROCEDURE — 99213 OFFICE O/P EST LOW 20 MIN: CPT | Performed by: STUDENT IN AN ORGANIZED HEALTH CARE EDUCATION/TRAINING PROGRAM

## 2024-01-19 RX ORDER — AZITHROMYCIN 250 MG/1
TABLET, FILM COATED ORAL DAILY
Qty: 6 TABLET | Refills: 0
Start: 2024-01-19 | End: 2024-01-24

## 2024-01-19 NOTE — PROGRESS NOTES
Assessment/Plan:    No problem-specific Assessment & Plan notes found for this encounter.       Diagnoses and all orders for this visit:    Bronchitis  -     azithromycin (Zithromax) 250 mg tablet; Take 2 tablets (500 mg total) by mouth daily for 1 day, THEN 1 tablet (250 mg total) daily for 4 days.  -     dextromethorphan-guaifenesin (MUCINEX DM)  MG per 12 hr tablet; Take 1 tablet by mouth every 12 (twelve) hours    Acute cough  -     azithromycin (Zithromax) 250 mg tablet; Take 2 tablets (500 mg total) by mouth daily for 1 day, THEN 1 tablet (250 mg total) daily for 4 days.  -     dextromethorphan-guaifenesin (MUCINEX DM)  MG per 12 hr tablet; Take 1 tablet by mouth every 12 (twelve) hours    Congestion of nasal sinus  -     azithromycin (Zithromax) 250 mg tablet; Take 2 tablets (500 mg total) by mouth daily for 1 day, THEN 1 tablet (250 mg total) daily for 4 days.  -     dextromethorphan-guaifenesin (MUCINEX DM)  MG per 12 hr tablet; Take 1 tablet by mouth every 12 (twelve) hours    Otalgia of both ears    Acute nonintractable headache, unspecified headache type          Patient is a 23-year-old female presenting today with cough, congestion, ear pain, headache for 7 days.  Her ear pain is likely a result of congestion.  She was seen recently and prescribed some Tessalon Perles which have not helped enough with the cough.  I recommended starting Mucinex DM 2 tabs every 12 hours.  I will also start her on a Zithromax Z-GABINO as she has recent exposure to bronchitis from a family member who was hospitalized.  I discussed taking the full antibiotic course and following up in office if symptoms do not improve after treatment.  I did state that this could possibly be a viral cough as well and could linger after the virus is out of her system sometimes up to several weeks.  She may continue the Tessalon if needed.    Recommend supportive care with fluids, rest, flonase 1-2 sprays each nostril, otc  "claritin or zyrtec daily and mucinex as directed. Tylenol recommended prn for pain or fever.  Instructed pt RTO if symptoms persist or worsen over the course of the next week     20 minutes spent on physical exam and plan of care.  This note was dictated using software.    Subjective:      Patient ID: Celsa Sam is a 23 y.o. female.    HPI    The following portions of the patient's history were reviewed and updated as appropriate: allergies, current medications, past family history, past medical history, past social history, past surgical history, and problem list.    Patient complains of cough, congestion. Onset of symptoms was 7 week ago, and has been gradually worsening since that time. Treatment to date: tessalon perles.      Review of Systems   HENT:  Positive for congestion and ear pain.    Respiratory:  Positive for cough. Negative for shortness of breath.    Cardiovascular:  Negative for chest pain.   Gastrointestinal:  Negative for abdominal pain, constipation, diarrhea, nausea and vomiting.   Genitourinary:  Negative for difficulty urinating.   Skin:  Negative for rash.   Neurological:  Positive for headaches.         Objective:      /66 (BP Location: Left arm, Patient Position: Sitting, Cuff Size: Standard)   Pulse (!) 108   Temp 98.7 °F (37.1 °C)   Ht 5' 2\" (1.575 m)   Wt 45.4 kg (100 lb)   SpO2 97%   BMI 18.29 kg/m²          Physical Exam  Vitals and nursing note reviewed.   Constitutional:       General: She is not in acute distress.     Appearance: Normal appearance. She is ill-appearing.   HENT:      Head: Normocephalic and atraumatic.      Right Ear: Tympanic membrane, ear canal and external ear normal. There is no impacted cerumen.      Left Ear: Tympanic membrane, ear canal and external ear normal. There is no impacted cerumen.      Nose: Congestion present.      Mouth/Throat:      Mouth: Mucous membranes are moist.      Pharynx: Oropharynx is clear. No oropharyngeal exudate or " posterior oropharyngeal erythema.      Comments: Postnasal drip  Eyes:      Extraocular Movements: Extraocular movements intact.      Conjunctiva/sclera: Conjunctivae normal.      Pupils: Pupils are equal, round, and reactive to light.   Cardiovascular:      Rate and Rhythm: Normal rate and regular rhythm.      Heart sounds: Normal heart sounds. No murmur heard.     No friction rub. No gallop.   Pulmonary:      Effort: Pulmonary effort is normal. No respiratory distress.      Breath sounds: Normal breath sounds. No wheezing.   Musculoskeletal:         General: Normal range of motion.      Cervical back: Normal range of motion.   Skin:     General: Skin is warm and dry.      Findings: No erythema or rash.   Neurological:      General: No focal deficit present.      Mental Status: She is alert and oriented to person, place, and time. Mental status is at baseline.   Psychiatric:         Mood and Affect: Mood normal.         Behavior: Behavior normal.         Thought Content: Thought content normal.         Judgment: Judgment normal.

## 2024-02-12 ENCOUNTER — OFFICE VISIT (OUTPATIENT)
Age: 24
End: 2024-02-12

## 2024-02-12 VITALS
DIASTOLIC BLOOD PRESSURE: 68 MMHG | OXYGEN SATURATION: 98 % | SYSTOLIC BLOOD PRESSURE: 100 MMHG | HEART RATE: 86 BPM | BODY MASS INDEX: 18.77 KG/M2 | WEIGHT: 102 LBS | HEIGHT: 62 IN

## 2024-02-12 DIAGNOSIS — H10.9 CONJUNCTIVITIS, UNSPECIFIED CONJUNCTIVITIS TYPE, UNSPECIFIED LATERALITY: ICD-10-CM

## 2024-02-12 DIAGNOSIS — R09.89 RUNNY NOSE: ICD-10-CM

## 2024-02-12 DIAGNOSIS — Z20.828 EXPOSURE TO THE FLU: Primary | ICD-10-CM

## 2024-02-12 PROCEDURE — 99213 OFFICE O/P EST LOW 20 MIN: CPT | Performed by: STUDENT IN AN ORGANIZED HEALTH CARE EDUCATION/TRAINING PROGRAM

## 2024-02-12 PROCEDURE — TOBRAMYCIN 0.3% OPH TOBRAMYCIN 0.3% OPH: Performed by: STUDENT IN AN ORGANIZED HEALTH CARE EDUCATION/TRAINING PROGRAM

## 2024-02-12 RX ORDER — TOBRAMYCIN 3 MG/ML
1 SOLUTION/ DROPS OPHTHALMIC
Start: 2024-02-12

## 2024-02-12 RX ORDER — OSELTAMIVIR PHOSPHATE 75 MG/1
75 CAPSULE ORAL DAILY
Qty: 10 CAPSULE | Refills: 0 | Status: SHIPPED | OUTPATIENT
Start: 2024-02-12 | End: 2024-02-22

## 2024-02-12 NOTE — LETTER
February 12, 2024     Patient: Celsa Sam  YOB: 2000  Date of Visit: 2/12/2024      To Whom it May Concern:    Celsa Sam is under my professional care. Celsa was seen in my office on 2/12/2024. Celsa may return to work on 2/12/24 .    If you have any questions or concerns, please don't hesitate to call.         Sincerely,          Oscar Ren,         CC: No Recipients

## 2024-02-12 NOTE — PROGRESS NOTES
Assessment/Plan:    No problem-specific Assessment & Plan notes found for this encounter.       Diagnoses and all orders for this visit:    Exposure to the flu  -     oseltamivir (TAMIFLU) 75 mg capsule; Take 1 capsule (75 mg total) by mouth daily for 10 days    Runny nose  -     oseltamivir (TAMIFLU) 75 mg capsule; Take 1 capsule (75 mg total) by mouth daily for 10 days    Conjunctivitis, unspecified conjunctivitis type, unspecified laterality  -     tobramycin (TOBREX) 0.3 % SOLN; Administer 1 drop to the right eye every 4 (four) hours while awake  -     oseltamivir (TAMIFLU) 75 mg capsule; Take 1 capsule (75 mg total) by mouth daily for 10 days          Patient is a 23-year-old female presenting today with exposure to the flu and symptoms of possible pinkeye.  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  I also discussed recent flu exposure and recommend putting her on Tamiflu 1 capsule daily for prophylaxis.  She may continue her current activities.  I recommend supportive care at home if she begins to develop any other symptoms    Recommend supportive care with fluids, rest, flonase 1-2 sprays each nostril, otc claritin or zyrtec daily and mucinex as directed. Tylenol recommended prn for pain or fever.  Instructed pt RTO if symptoms persist or worsen over the course of the next week     20 minutes spent on physical exam and plan of care.  This note was dictated using software.    Subjective:      Patient ID: Celsa Sam is a 23 y.o. female.    HPI    The following portions of the patient's history were reviewed and updated as appropriate: allergies, current medications, past family history, past medical history, past social history, past surgical history, and problem list.    Patient is a 23-year-old female who is presenting today with concerns of flu exposure.She reports nasal drainage, eye irritation.   "Symptoms just started in the past few days.  The nasal drainage has been kind of consistent for couple weeks but she was recently around kids at the  with eliana.  She woke up this morning with her eye feeling matted shut on the right.  No other reported symptoms.  She did have recent exposure to her brother who is positive for flu    Review of Systems   HENT:  Positive for rhinorrhea.    Eyes:  Positive for discharge and redness.   Respiratory:  Negative for shortness of breath.    Cardiovascular:  Negative for chest pain.   Gastrointestinal:  Negative for abdominal pain, constipation and diarrhea.   Skin:  Negative for rash.         Objective:      /68 (BP Location: Right arm, Patient Position: Sitting, Cuff Size: Standard)   Pulse 86   Ht 5' 2\" (1.575 m)   Wt 46.3 kg (102 lb)   SpO2 98%   BMI 18.66 kg/m²          Physical Exam  Vitals and nursing note reviewed.   Constitutional:       General: She is not in acute distress.     Appearance: Normal appearance.   HENT:      Head: Normocephalic and atraumatic.      Right Ear: External ear normal.      Left Ear: External ear normal.      Nose: Rhinorrhea present. No congestion.      Mouth/Throat:      Mouth: Mucous membranes are moist.      Pharynx: Oropharynx is clear. No oropharyngeal exudate or posterior oropharyngeal erythema.   Eyes:      Extraocular Movements: Extraocular movements intact.      Pupils: Pupils are equal, round, and reactive to light.      Comments: Mild conjunctiva injection right   Cardiovascular:      Rate and Rhythm: Normal rate and regular rhythm.      Heart sounds: Normal heart sounds. No murmur heard.     No friction rub. No gallop.   Pulmonary:      Effort: Pulmonary effort is normal. No respiratory distress.      Breath sounds: Normal breath sounds. No wheezing.   Musculoskeletal:         General: Normal range of motion.      Cervical back: Normal range of motion.   Skin:     General: Skin is warm and dry.      Findings: " No erythema or rash.   Neurological:      General: No focal deficit present.      Mental Status: She is alert and oriented to person, place, and time. Mental status is at baseline.   Psychiatric:         Mood and Affect: Mood normal.         Behavior: Behavior normal.         Thought Content: Thought content normal.         Judgment: Judgment normal.

## 2024-02-12 NOTE — LETTER
February 12, 2024     Patient: Celsa Sam  YOB: 2000  Date of Visit: 2/12/2024      To Whom it May Concern:    Celsa Sam is under my professional care. eClsa was seen in my office on 2/12/2024. Celsa may return to work on 2/13/24 .    If you have any questions or concerns, please don't hesitate to call.         Sincerely,          Oscar Ren,         CC: No Recipients

## 2024-02-14 ENCOUNTER — TELEPHONE (OUTPATIENT)
Age: 24
End: 2024-02-14

## 2024-02-14 NOTE — TELEPHONE ENCOUNTER
I want her to switch the way she is taking the medication.  I will switch her over to the treatment dose for flu and have her take the rest of what she has twice a day.  Otherwise she should be taking Tylenol for any aches and pains, Mucinex for any congestion, she should stay hydrated and get rest.

## 2024-02-14 NOTE — TELEPHONE ENCOUNTER
Patient called stating she feels worse. She is taking medication once a day. She did not go to work today. She is asking is there anything else she needs to do.

## 2024-02-26 DIAGNOSIS — F41.9 ANXIETY: Primary | ICD-10-CM

## 2024-02-29 ENCOUNTER — OFFICE VISIT (OUTPATIENT)
Dept: FAMILY MEDICINE CLINIC | Facility: CLINIC | Age: 24
End: 2024-02-29

## 2024-02-29 VITALS
WEIGHT: 100 LBS | DIASTOLIC BLOOD PRESSURE: 84 MMHG | OXYGEN SATURATION: 100 % | TEMPERATURE: 101.7 F | HEIGHT: 62 IN | SYSTOLIC BLOOD PRESSURE: 106 MMHG | HEART RATE: 100 BPM | BODY MASS INDEX: 18.4 KG/M2

## 2024-02-29 DIAGNOSIS — R11.0 NAUSEA: ICD-10-CM

## 2024-02-29 DIAGNOSIS — J10.1 INFLUENZA A: Primary | ICD-10-CM

## 2024-02-29 LAB
SL AMB POCT RAPID FLU A: POSITIVE
SL AMB POCT RAPID FLU B: NEGATIVE

## 2024-02-29 PROCEDURE — 99213 OFFICE O/P EST LOW 20 MIN: CPT | Performed by: NURSE PRACTITIONER

## 2024-02-29 PROCEDURE — 87804 INFLUENZA ASSAY W/OPTIC: CPT | Performed by: NURSE PRACTITIONER

## 2024-02-29 RX ORDER — ONDANSETRON 4 MG/1
4 TABLET, FILM COATED ORAL EVERY 8 HOURS PRN
Qty: 8 TABLET | Refills: 0 | Status: SHIPPED | OUTPATIENT
Start: 2024-02-29

## 2024-02-29 NOTE — ASSESSMENT & PLAN NOTE
POC testing positive for Influenza A today.  Symptoms include body aches, chills, fatigue, nausea, vomiting.  Zofran sent to pharmacy for vomiting.  Symptoms have been present for 4 days.  Advised Tamiflu within 48 hours, so at this time symptomatic care is best course of action.  Advised on increasing fluids, Tylenol/ibuprofen as needed for fevers, do not return to work/school until you are at least 24 hours fever free.  If symptoms worsen, or signs of dehydration present, follow-up for more emergent care.  Patient verbalized understanding and is in agreement with plan.  Zofran was sent to pharmacy for nausea and vomiting, discussed side effects and use with patient.

## 2024-02-29 NOTE — PROGRESS NOTES
Assessment/Plan:    Influenza A  POC testing positive for Influenza A today.  Symptoms include body aches, chills, fatigue, nausea, vomiting.  Zofran sent to pharmacy for vomiting.  Symptoms have been present for 4 days.  Advised Tamiflu within 48 hours, so at this time symptomatic care is best course of action.  Advised on increasing fluids, Tylenol/ibuprofen as needed for fevers, do not return to work/school until you are at least 24 hours fever free.  If symptoms worsen, or signs of dehydration present, follow-up for more emergent care.  Patient verbalized understanding and is in agreement with plan.  Zofran was sent to pharmacy for nausea and vomiting, discussed side effects and use with patient.       Diagnoses and all orders for this visit:    Influenza A  -     POCT rapid flu A and B    Nausea  -     ondansetron (ZOFRAN) 4 mg tablet; Take 1 tablet (4 mg total) by mouth every 8 (eight) hours as needed for nausea or vomiting for up to 20 doses          Subjective:      Patient ID: Celsa Sam is a 23 y.o. female.    Patient here today with concerns of bodyaches, chills, mild ear pain, mild cough, nausea and vomiting.  Patient reports symptoms started approximately 4 days ago with sudden onset.  Patient unsure if she has had fevers.  Patient denies taking any over-the-counter medication.  Patient has not tested for COVID at this time.       Flu Symptoms  The current episode started in the past 7 days. The problem is unchanged. The pain is moderate. Nothing aggravates the symptoms. Associated symptoms include ear pain, fatigue, a fever, coughing, nausea, vomiting and muscle aches. Pertinent negatives include no congestion, decreased vision, double vision, ear discharge, eye discharge, eye itching, eye pain, eye redness, headaches, hearing loss, mouth sores, photophobia, rhinorrhea, sore throat, stridor, swollen glands, URI, weight gain, weight loss, chest pain, shortness of breath, wheezing, abdominal pain,  "constipation, diarrhea, joint pain, joint swelling, neck pain, neck stiffness or rash. Past treatments include nothing. The cough is Non-productive. Nothing relieves the cough. The ear pain is mild. There is pain in both ears. There is no abnormality behind the ear. She has Not been pulling at the affected ear. The vomiting occurs rarely. The emesis has an appearance of stomach contents. The vomiting is Not associated with pain. She has been Behaving normally. She has been Eating and drinking normally. Urine output has been normal.       The following portions of the patient's history were reviewed and updated as appropriate: allergies, current medications, past family history, past medical history, past social history, past surgical history, and problem list.    Review of Systems   Constitutional:  Positive for chills, fatigue and fever. Negative for activity change, appetite change, diaphoresis, unexpected weight change, weight gain and weight loss.   HENT:  Positive for ear pain. Negative for congestion, ear discharge, hearing loss, mouth sores, rhinorrhea, sinus pressure, sinus pain, sore throat and trouble swallowing.    Eyes:  Negative for double vision, photophobia, pain, discharge, redness and itching.   Respiratory:  Positive for cough. Negative for apnea, choking, chest tightness, shortness of breath, wheezing and stridor.    Cardiovascular:  Negative for chest pain.   Gastrointestinal:  Positive for nausea and vomiting. Negative for abdominal distention, abdominal pain, anal bleeding, blood in stool, constipation and diarrhea.   Musculoskeletal: Negative.  Negative for joint pain, joint swelling and neck pain.   Skin:  Negative for rash.   Neurological: Negative.  Negative for dizziness and headaches.         Objective:      /84 (BP Location: Left arm, Patient Position: Sitting, Cuff Size: Standard)   Pulse 100   Temp (!) 101.7 °F (38.7 °C) (Tympanic)   Ht 5' 2\" (1.575 m)   Wt 45.4 kg (100 lb)  "  SpO2 100%   BMI 18.29 kg/m²          Physical Exam  Vitals and nursing note reviewed.   Constitutional:       General: She is not in acute distress.     Appearance: Normal appearance. She is ill-appearing.   HENT:      Head: Normocephalic and atraumatic.      Right Ear: Tympanic membrane, ear canal and external ear normal.      Left Ear: Tympanic membrane, ear canal and external ear normal.      Nose: Nose normal. No congestion or rhinorrhea.      Mouth/Throat:      Mouth: Mucous membranes are moist.      Pharynx: Posterior oropharyngeal erythema present. No oropharyngeal exudate.   Eyes:      General:         Right eye: No discharge.         Left eye: No discharge.      Conjunctiva/sclera: Conjunctivae normal.   Cardiovascular:      Rate and Rhythm: Normal rate and regular rhythm.      Heart sounds: Normal heart sounds. No murmur heard.  Pulmonary:      Effort: Pulmonary effort is normal. No respiratory distress.      Breath sounds: Normal breath sounds. No stridor. No wheezing, rhonchi or rales.   Chest:      Chest wall: No tenderness.   Abdominal:      General: Bowel sounds are normal. There is no distension.      Palpations: Abdomen is soft. There is no mass.      Tenderness: There is no abdominal tenderness. There is no right CVA tenderness, left CVA tenderness, guarding or rebound.      Hernia: No hernia is present.   Musculoskeletal:         General: Normal range of motion.      Cervical back: Normal range of motion.      Right lower leg: No edema.      Left lower leg: No edema.   Lymphadenopathy:      Cervical: No cervical adenopathy.   Skin:     General: Skin is warm and dry.   Neurological:      Mental Status: She is alert and oriented to person, place, and time.   Psychiatric:         Mood and Affect: Mood normal.         Behavior: Behavior normal.         Thought Content: Thought content normal.         Judgment: Judgment normal.

## 2024-04-11 ENCOUNTER — OFFICE VISIT (OUTPATIENT)
Age: 24
End: 2024-04-11
Payer: COMMERCIAL

## 2024-04-11 VITALS
OXYGEN SATURATION: 99 % | TEMPERATURE: 98.3 F | HEART RATE: 108 BPM | HEIGHT: 62 IN | RESPIRATION RATE: 14 BRPM | DIASTOLIC BLOOD PRESSURE: 79 MMHG | SYSTOLIC BLOOD PRESSURE: 102 MMHG | WEIGHT: 97.2 LBS | BODY MASS INDEX: 17.89 KG/M2

## 2024-04-11 DIAGNOSIS — R10.84 GENERALIZED ABDOMINAL PAIN: ICD-10-CM

## 2024-04-11 DIAGNOSIS — K52.9 ACUTE GASTROENTERITIS: Primary | ICD-10-CM

## 2024-04-11 PROCEDURE — 99213 OFFICE O/P EST LOW 20 MIN: CPT | Performed by: NURSE PRACTITIONER

## 2024-04-11 RX ORDER — DEXTROMETHORPHAN HYDROBROMIDE AND PROMETHAZINE HYDROCHLORIDE 15; 6.25 MG/5ML; MG/5ML
SYRUP ORAL
COMMUNITY
Start: 2024-02-16

## 2024-04-11 RX ORDER — ONDANSETRON 4 MG/1
4 TABLET, FILM COATED ORAL EVERY 8 HOURS PRN
Qty: 20 TABLET | Refills: 0 | Status: SHIPPED | OUTPATIENT
Start: 2024-04-11

## 2024-04-11 NOTE — PROGRESS NOTES
[facility]        NAME: Celsa Sam is a 23 y.o. female  : 2000    MRN: [id number]  DATE: 2024  TIME: 9:36 AM    Assessment and Plan   Acute gastroenteritis [K52.9]  1. Acute gastroenteritis  ondansetron (ZOFRAN) 4 mg tablet      2. Generalized abdominal pain          Acute symptomatic started yesterday associated with nausea vomiting diarrhea and diffuse abdominal pain.  Has been eating and drinking normally.  Urine dip mostly unremarkable except trace ketones and negative pregnancy test.  Discussed with mother and patient that if symptoms worsen should report straight to the ED for further evaluation and treatment.  Appears to correlate with the viral gastroenteritis educated bland diet increase fluids monitor input output (if output decreases worsening fatigue should report straight to ED) and follow-up with PCP as needed    Patient Instructions       Follow up with PCP in 3-5 days.  Proceed to  ER if symptoms worsen.    If tests have been performed at [facility], our office will contact you with results if changes need to be made to the care plan discussed with you at the visit.  You can review your full results on [facility] MyChart.    Chief Complaint     Chief Complaint   Patient presents with   • Vomiting     Vomiting since last night, last threw up this AM  States some pain in stomach if you press on it, states pain is 10/10  States also had diarrhea this AM, states also started last night, had three episodes of diarrhea so far             History of Present Illness       Patient is a 23-year-old female arrives with mother with complaints of new onset vomiting abdominal pain and diarrhea started last night.  3 episodes of diarrhea so far.  Denies blood in stool or vomit no coffee-ground emesis.  Does have a past medical history of kidney surgery at the age of 5 months old and splenomegaly.  Denies fever and all other viral symptoms.  Does report mild weakness.  Has  been eating and drinking normally. Denies being sexually active. Urine dip unremarkable except trace ketones. Negative preg dip in office.         Review of Systems   Review of Systems   Constitutional:  Positive for fatigue. Negative for activity change, appetite change, chills and fever.   HENT:  Negative for congestion, postnasal drip, rhinorrhea, sneezing and sore throat.    Respiratory:  Negative for cough, chest tightness, shortness of breath and wheezing.    Cardiovascular:  Negative for chest pain and palpitations.   Gastrointestinal:  Positive for abdominal pain (diffuse), diarrhea, nausea and vomiting. Negative for abdominal distention, anal bleeding, blood in stool and constipation.   Genitourinary:  Negative for dysuria, flank pain, hematuria and urgency.   Musculoskeletal:  Negative for arthralgias and myalgias.   Skin:  Negative for color change, pallor and rash.   Neurological:  Negative for dizziness, weakness, light-headedness and headaches.   Hematological:  Negative for adenopathy.   Psychiatric/Behavioral:  Negative for agitation and confusion.          Current Medications       Current Outpatient Medications:   •  ondansetron (ZOFRAN) 4 mg tablet, Take 1 tablet (4 mg total) by mouth every 8 (eight) hours as needed for nausea or vomiting for up to 20 doses, Disp: 20 tablet, Rfl: 0  •  ondansetron (ZOFRAN) 4 mg tablet, Take 1 tablet (4 mg total) by mouth every 8 (eight) hours as needed for nausea or vomiting for up to 20 doses (Patient not taking: Reported on 4/11/2024), Disp: 8 tablet, Rfl: 0  •  promethazine-dextromethorphan (PHENERGAN-DM) 6.25-15 mg/5 mL oral syrup, TAKE 5 ML BY MOUTH 4 TIMES A DAY AS NEEDED (Patient not taking: Reported on 4/11/2024), Disp: , Rfl:   •  tobramycin (TOBREX) 0.3 % SOLN, Administer 1 drop to the right eye every 4 (four) hours while awake (Patient not taking: Reported on 4/11/2024), Disp: , Rfl:     Current Allergies     Allergies as of 04/11/2024   • (No Known  "Allergies)            The following portions of the patient's history were reviewed and updated as appropriate: allergies, current medications, past family history, past medical history, past social history, past surgical history and problem list.     Past Medical History:   Diagnosis Date   • Enlargement, spleen    • Hydronephrosis        Past Surgical History:   Procedure Laterality Date   • KIDNEY SURGERY      5 mths old       History reviewed. No pertinent family history.      Medications have been verified.        Objective   /79   Pulse (!) 108   Temp 98.3 °F (36.8 °C) (Tympanic)   Resp 14   Ht 5' 2\" (1.575 m)   Wt 44.1 kg (97 lb 3.2 oz)   LMP 03/31/2024 (Approximate)   SpO2 99%   BMI 17.78 kg/m²   Patient's last menstrual period was 03/31/2024 (approximate).       Physical Exam     Physical Exam  Vitals and nursing note reviewed.   Constitutional:       General: She is not in acute distress.     Appearance: Normal appearance. She is ill-appearing. She is not diaphoretic.   HENT:      Head: Normocephalic and atraumatic.      Right Ear: Tympanic membrane, ear canal and external ear normal. There is no impacted cerumen.      Left Ear: Tympanic membrane, ear canal and external ear normal. There is no impacted cerumen.      Nose: No congestion or rhinorrhea.      Mouth/Throat:      Mouth: Mucous membranes are moist.      Pharynx: Oropharynx is clear. No oropharyngeal exudate or posterior oropharyngeal erythema.   Eyes:      General: No scleral icterus.        Right eye: No discharge.         Left eye: No discharge.      Extraocular Movements: Extraocular movements intact.      Conjunctiva/sclera: Conjunctivae normal.      Pupils: Pupils are equal, round, and reactive to light.   Cardiovascular:      Rate and Rhythm: Regular rhythm. Tachycardia present.   Pulmonary:      Effort: Pulmonary effort is normal. No respiratory distress.      Breath sounds: Normal breath sounds. No stridor. No wheezing, " rhonchi or rales.   Abdominal:      General: Abdomen is flat. Bowel sounds are normal. There is no distension.      Palpations: Abdomen is soft. There is no mass.      Tenderness: There is abdominal tenderness (diffuse abdomen). There is no right CVA tenderness, left CVA tenderness, guarding or rebound. Negative signs include Garcia's sign, Rovsing's sign and McBurney's sign.      Hernia: No hernia is present.   Musculoskeletal:         General: Normal range of motion.      Cervical back: Normal range of motion.   Lymphadenopathy:      Cervical: No cervical adenopathy.   Skin:     Coloration: Skin is not jaundiced or pale.      Findings: No bruising, erythema or rash.   Neurological:      General: No focal deficit present.      Mental Status: She is alert and oriented to person, place, and time.   Psychiatric:         Mood and Affect: Mood normal.         Behavior: Behavior normal.         Thought Content: Thought content normal.         Judgment: Judgment normal.

## 2024-04-29 PROBLEM — J10.1 INFLUENZA A: Status: RESOLVED | Noted: 2024-02-29 | Resolved: 2024-04-29

## 2024-07-01 ENCOUNTER — OFFICE VISIT (OUTPATIENT)
Age: 24
End: 2024-07-01
Payer: COMMERCIAL

## 2024-07-01 VITALS
HEART RATE: 101 BPM | RESPIRATION RATE: 18 BRPM | WEIGHT: 102.8 LBS | SYSTOLIC BLOOD PRESSURE: 102 MMHG | DIASTOLIC BLOOD PRESSURE: 60 MMHG | TEMPERATURE: 97.4 F | BODY MASS INDEX: 18.92 KG/M2 | HEIGHT: 62 IN | OXYGEN SATURATION: 99 %

## 2024-07-01 DIAGNOSIS — R10.31 RLQ ABDOMINAL PAIN: Primary | ICD-10-CM

## 2024-07-01 PROCEDURE — S9083 URGENT CARE CENTER GLOBAL: HCPCS | Performed by: NURSE PRACTITIONER

## 2024-07-01 PROCEDURE — G0382 LEV 3 HOSP TYPE B ED VISIT: HCPCS | Performed by: NURSE PRACTITIONER

## 2024-07-01 RX ORDER — NAPROXEN 500 MG/1
500 TABLET ORAL 2 TIMES DAILY WITH MEALS
Qty: 20 TABLET | Refills: 0 | Status: SHIPPED | OUTPATIENT
Start: 2024-07-01 | End: 2024-07-11

## 2024-07-01 NOTE — PROGRESS NOTES
[facility]        NAME: Celsa Sam is a 24 y.o. female  : 2000    MRN: [id number]  DATE: 2024  TIME: 7:29 PM    Assessment and Plan   RLQ abdominal pain [R10.31]  1. RLQ abdominal pain  naproxen (Naprosyn) 500 mg tablet        Acute symptomatic x 1 week does seem to be improving per patient.  Very minimal TTP to right lower quadrant.  Discussed with patient possibilities of of diagnosis within the right lower quadrant such as appendicitis versus ovarian cyst.  Patient is not currently sexually active no chance of pregnancy.  She is not in acute distress in office.  Discussed with patient starting naproxen twice daily take with food and water and should follow-up with either gyn/pcp for further evaluation and treatment.  Discussed red flags with patient and mother such as worsening of right lower quadrant pain blood in stool or urine vomiting should report straight to emergency department for further evaluation.  Patient and mother verbalized understanding    Patient Instructions       Follow up with PCP in 3-5 days.  Proceed to  ER if symptoms worsen.    If tests have been performed at [facility], our office will contact you with results if changes need to be made to the care plan discussed with you at the visit.  You can review your full results on [facility] MyChart.    Chief Complaint     Chief Complaint   Patient presents with   • Hip Pain     Pt is here for R side hip pain. Pt states this started last Wednesday. Pt states she doesn't remember injuring herself. Pt states no OTC meds or ice/heat.         History of Present Illness       Patient is a 24-year-old female arrives with right lower quadrant pain does not radiate.  Reports it 7 out of 10 pain scale.  Patient is not in acute distress in office.  She reports the pain is constant sharp when she is attempts to sit down mostly an ache when she is walking.  She denies any fever no changes of bowel bladder habits no blood  in her urine or stool denies any past medical history of ovarian cysts has not had her appendix removed.  She does report having some nausea however that is normal for her and maybe it did increase a little bit but she is unsure.  She denies any vomiting.  She does see a gyn and is scheduled in August.  She did not attempt to schedule with her PCP.        Review of Systems   Review of Systems   Constitutional:  Negative for activity change, appetite change, chills, fatigue and fever.   HENT:  Negative for congestion, postnasal drip, rhinorrhea, sneezing and sore throat.    Respiratory:  Negative for cough, chest tightness, shortness of breath and wheezing.    Cardiovascular:  Negative for chest pain and palpitations.   Gastrointestinal:  Positive for abdominal pain (RLQ). Negative for abdominal distention, blood in stool, constipation, diarrhea, nausea and vomiting.   Musculoskeletal:  Negative for arthralgias and myalgias.   Skin:  Negative for color change, pallor and rash.   Neurological:  Negative for dizziness, weakness, light-headedness and headaches.   Hematological:  Negative for adenopathy.   Psychiatric/Behavioral:  Negative for agitation and confusion.          Current Medications       Current Outpatient Medications:   •  naproxen (Naprosyn) 500 mg tablet, Take 1 tablet (500 mg total) by mouth 2 (two) times a day with meals for 10 days, Disp: 20 tablet, Rfl: 0  •  ondansetron (ZOFRAN) 4 mg tablet, Take 1 tablet (4 mg total) by mouth every 8 (eight) hours as needed for nausea or vomiting for up to 20 doses, Disp: 8 tablet, Rfl: 0  •  ondansetron (ZOFRAN) 4 mg tablet, Take 1 tablet (4 mg total) by mouth every 8 (eight) hours as needed for nausea or vomiting for up to 20 doses, Disp: 20 tablet, Rfl: 0  •  promethazine-dextromethorphan (PHENERGAN-DM) 6.25-15 mg/5 mL oral syrup, TAKE 5 ML BY MOUTH 4 TIMES A DAY AS NEEDED (Patient not taking: Reported on 4/11/2024), Disp: , Rfl:   •  tobramycin (TOBREX) 0.3 %  "SOLN, Administer 1 drop to the right eye every 4 (four) hours while awake (Patient not taking: Reported on 4/11/2024), Disp: , Rfl:     Current Allergies     Allergies as of 07/01/2024   • (No Known Allergies)            The following portions of the patient's history were reviewed and updated as appropriate: allergies, current medications, past family history, past medical history, past social history, past surgical history and problem list.     Past Medical History:   Diagnosis Date   • Enlargement, spleen    • Hydronephrosis        Past Surgical History:   Procedure Laterality Date   • KIDNEY SURGERY      5 mths old       History reviewed. No pertinent family history.      Medications have been verified.        Objective   /60   Pulse 101   Temp (!) 97.4 °F (36.3 °C)   Resp 18   Ht 5' 2\" (1.575 m)   Wt 46.6 kg (102 lb 12.8 oz)   LMP 06/27/2024 (Approximate)   SpO2 99%   BMI 18.80 kg/m²   Patient's last menstrual period was 06/27/2024 (approximate).       Physical Exam     Physical Exam  Vitals and nursing note reviewed.   Constitutional:       General: She is not in acute distress.     Appearance: Normal appearance. She is not ill-appearing, toxic-appearing or diaphoretic.   HENT:      Head: Normocephalic and atraumatic.      Nose: No congestion or rhinorrhea.   Eyes:      General: No scleral icterus.        Right eye: No discharge.         Left eye: No discharge.   Cardiovascular:      Rate and Rhythm: Normal rate and regular rhythm.   Pulmonary:      Effort: Pulmonary effort is normal. No respiratory distress.      Breath sounds: Normal breath sounds. No stridor. No wheezing, rhonchi or rales.   Abdominal:      General: Abdomen is flat. Bowel sounds are normal. There is no distension.      Palpations: Abdomen is soft. There is no hepatomegaly, splenomegaly or mass.      Tenderness: There is abdominal tenderness (very minimal ttp on rlq). There is no right CVA tenderness, left CVA tenderness, " guarding or rebound. Negative signs include Garcia's sign, Rovsing's sign, McBurney's sign, psoas sign and obturator sign.   Musculoskeletal:         General: Normal range of motion.      Cervical back: Normal range of motion.   Skin:     Coloration: Skin is not jaundiced or pale.      Findings: No bruising, erythema or rash.   Neurological:      General: No focal deficit present.      Mental Status: She is alert and oriented to person, place, and time.   Psychiatric:         Mood and Affect: Mood normal.         Behavior: Behavior normal.         Thought Content: Thought content normal.         Judgment: Judgment normal.

## 2024-07-07 ENCOUNTER — OFFICE VISIT (OUTPATIENT)
Age: 24
End: 2024-07-07
Payer: COMMERCIAL

## 2024-07-07 VITALS
WEIGHT: 100.2 LBS | HEART RATE: 88 BPM | HEIGHT: 62 IN | SYSTOLIC BLOOD PRESSURE: 141 MMHG | BODY MASS INDEX: 18.44 KG/M2 | DIASTOLIC BLOOD PRESSURE: 73 MMHG | RESPIRATION RATE: 16 BRPM | OXYGEN SATURATION: 95 % | TEMPERATURE: 97.9 F

## 2024-07-07 DIAGNOSIS — S29.019A THORACIC MYOFASCIAL STRAIN, INITIAL ENCOUNTER: Primary | ICD-10-CM

## 2024-07-07 DIAGNOSIS — M25.512 NONTRAUMATIC SHOULDER PAIN, LEFT: ICD-10-CM

## 2024-07-07 PROCEDURE — G0382 LEV 3 HOSP TYPE B ED VISIT: HCPCS | Performed by: EMERGENCY MEDICINE

## 2024-07-07 PROCEDURE — S9083 URGENT CARE CENTER GLOBAL: HCPCS | Performed by: EMERGENCY MEDICINE

## 2024-07-07 RX ORDER — CYCLOBENZAPRINE HCL 5 MG
5 TABLET ORAL 3 TIMES DAILY PRN
Qty: 20 TABLET | Refills: 0 | Status: SHIPPED | OUTPATIENT
Start: 2024-07-07

## 2024-07-07 NOTE — PATIENT INSTRUCTIONS
Gentle stretching as discussed.  Encourage pain free movement of the affected arm but do not attempt any activities that will cause pain in that arm / shoulder.  Pendulum exercise as discussed at least 4 times a day.  Use heat, as discussed, 10 minutes every 2 - 3 hours to increase circulation.      Patient Education     Upper Back Pain ED   General Information   You came to the Emergency Department (ED) for upper back pain. This is pain that happens anywhere from the bottom of your neck to the middle part of your back. You may have pain in your neck, arms, and shoulders as well. Upper back pain is not as common as low back pain. Most of the time, it will get better on its own.  The doctors may or may not know the exact cause of your pain. You may be waiting on some tests results. The staff will contact you if there are concerning results.  What care is needed at home?   Call your regular doctor to let them know you were in the ED. Make a follow-up appointment if you were told to.  Stay as active as you can without causing too much pain. It is OK to rest your back for a day or so. Be sure to get up and move around during the day as you are able. After a few days, slowly start to increase your activity level as you are able to. If something causes your pain to come back or get worse, stop and go back to doing easier activities that did not hurt.  If the doctors think your pain is most likely from a strained muscle or injury, you can put ice on your back a few times a day for a day or 2. Wrap an ice pack in a towel and put it on your back for 10 to 15 minutes at a time. After 2 days, you may want to use heat on your back. You can try putting a heating pad or warm towel on your back for 20 minutes at a time a few times each day. Never go to sleep with heat or ice on your back.  Protect your back. Limit sports, twisting, and reaching until your back feels better.  Do not sit or  one position for a long  time.  You may want to take medicines like ibuprofen or naproxen for swelling and pain. These are nonsteroidal anti-inflammatory drugs (NSAIDs).  When do I need to get emergency help?   Return to the ED if:   You have trouble breathing.  Your pain becomes severe.  You develop new weakness in one or both of your arms or you cannot move your arm.  When do I need to call the doctor?   Your arms are numb, weak, or tingly.  You have a fever of 100.4°F (38°C) or higher or chills or are coughing up mucus that looks green or yellow.  You are not able to do your daily activities because of the pain.  You are not able to sleep because of the pain.  You have new or worsening symptoms.  Last Reviewed Date   2021-06-02  Consumer Information Use and Disclaimer   This generalized information is a limited summary of diagnosis, treatment, and/or medication information. It is not meant to be comprehensive and should be used as a tool to help the user understand and/or assess potential diagnostic and treatment options. It does NOT include all information about conditions, treatments, medications, side effects, or risks that may apply to a specific patient. It is not intended to be medical advice or a substitute for the medical advice, diagnosis, or treatment of a health care provider based on the health care provider's examination and assessment of a patient’s specific and unique circumstances. Patients must speak with a health care provider for complete information about their health, medical questions, and treatment options, including any risks or benefits regarding use of medications. This information does not endorse any treatments or medications as safe, effective, or approved for treating a specific patient. [employer] and its affiliates disclaim any warranty or liability relating to this information or the use thereof. The use of this information is governed by the Terms of Use, available at  "https://www.wolVisedouwer.com/en/know/clinical-effectiveness-terms   Copyright   Copyright © 2024 [employer]. and its affiliates and/or licensors. All rights reserved.  Patient Education     Shoulder pain   The Basics   Written by the doctors and editors at [employer]   What are the parts of the shoulder? -- The shoulder joint is made up of the upper arm bone, collarbone, and shoulder blade. It includes muscles, ligaments, tendons, and bursae (figure 1). All of these parts work together to help the shoulder move comfortably in different directions.  What can cause shoulder pain? -- Shoulder pain can happen when you damage or injure any of the different parts of the shoulder.  Different conditions can cause shoulder pain. They include:   Bursitis - This is a condition that can cause pain or swelling next to a joint. A \"bursa\" is a small fluid-filled sac that sits near a bone. It cushions and protects nearby tissues when they rub on or slide over bones. Bursitis is when a bursa gets irritated and swollen.   Frozen shoulder - This is a condition that causes the shoulder to be stiff, painful, and hard to move. If you have a frozen shoulder, the tissue around the shoulder joint gets thick and tight. This might happen after a shoulder gets injury or surgery.   Rotator cuff injury - The rotator cuff is made up of 4 shoulder muscles and their tendons. Tendons are strong bands of tissue that connect muscles to bones. Rotator cuff injuries cause pain in your shoulder and sometimes in your upper arm.   Shoulder impingement - This happens when a muscle, tendon, or bursa gets squeezed between bones.    shoulder - This happens when certain ligaments tear or get stretched too much. Ligaments are strong bands of tissue that connect bone to bone. The most common causes of a  shoulder are falling on the shoulder or getting hit in the shoulder. A  shoulder can be mild or severe, depending on how many " "ligaments are torn.   Osteoarthritis - This is a common condition that often comes with age. The cartilage in the joints wears down, causing the bones to rub against each other. This can cause pain, stiffness, and swelling in the shoulder joints.  Will I need tests? -- Maybe. Your doctor or nurse will talk with you and do an exam. They might also do an imaging test of your shoulder such as an X-ray, MRI, or ultrasound. Imaging tests create pictures of the inside of the body.  How is shoulder pain treated? -- Many causes of shoulder pain get better on their own. But it can take weeks to months to heal completely.  Your doctor might recommend:   Pain-relieving medicines called \"nonsteroidal anti-inflammatory drugs\" (NSAIDs) - These include ibuprofen (sample brand names: Advil, Motrin) and naproxen (sample brand name: Aleve). They can reduce pain and swelling.   Exercises and stretches - It can help to work with a physical therapist (exercise expert). They can teach you gentle stretches and exercises to help with your symptoms. Follow the physical therapist's advice for how often and when to do the exercises.   Steroid injections - Steroid medicines help reduce inflammation. Doctors can inject steroids into the shoulder to help reduce symptoms.   Surgery - Surgery might be an option if other treatments do not work and you have had symptoms for a long time.  Is there anything I can do on my own to feel better?    Rest your shoulder - Avoid lifting things, reaching overhead or across your chest, or sleeping on the shoulder that hurts. If your doctor gave you a sling to support your arm, follow instructions for using it. Or you might get a bandage that goes around your shoulders and upper back instead.   Take medicine to reduce pain and swelling - To treat pain, you can take acetaminophen (sample brand name: Tylenol). To treat pain and swelling, you can take ibuprofen (sample brand names: Advil, Motrin).   Prop your " shoulder on pillows - Keep it raised above the level of your heart. This can help with pain and swelling.   Ice your shoulder - Put a cold gel pack, bag of ice, or bag of frozen vegetables on the injured area every 1 to 2 hours, for 15 minutes each time. Put a thin towel between the ice (or other cold object) and your skin. Use the ice (or other cold object) for at least 6 hours after your injury. Some people find it helpful to ice longer, even up to 2 days after their injury. Ice can also be helpful after doing shoulder exercises.   Put heat on the area to reduce pain and stiffness - Do not use heat for more than 20 minutes at a time. Also, do not use anything too hot that could burn your skin.   Do pendulum exercises to keep your shoulder from getting too stiff (figure 2):   Let your arm relax and hang down while you sit or stand. Gently move your arm:   Back and forth at your side   Side to side across your body   Around in small circles   Do this exercise for 5 minutes, 1 or 2 times a day.   Start slowly, and make the exercises harder over time. For example, make small circles with your arm at first. Over time, make bigger circles or hold weights in your hand.   Your doctor, nurse, or physical therapist can show you how to do other exercises to strengthen the muscles around your shoulder. They will tell you when to start them and how often to do them.   Before exercising your shoulder, warm up the muscles first. You can do this by taking a hot shower or bath, or using a heating pad. Some soreness is normal. If you have sharp, tearing, or worsening pain, stop what you're doing and let your doctor or nurse know.  When should I call the doctor? -- Call for emergency help right away (in the [place] and [place], call 9-1-1) if:   You have shoulder pain and start to have trouble breathing or bad chest discomfort.  Call the doctor or nurse for advice if:   You have very bad pain that is not helped by medicines.   Your hand  "or arm becomes weak or swollen.   Your fingers are numb, tingly, or blue or gray in color.  All topics are updated as new evidence becomes available and our peer review process is complete.  This topic retrieved from [employer] on: Apr 25, 2024.  Topic 779683 Version 3.0  Release: 32.4.2 - C32.114  © 2024 [employer]. and/or its affiliates. All rights reserved.  figure 1: Parts of the shoulder     These are the different parts of the shoulder as viewed from the front (A) and the back (B). The shoulder joint is made up of the upper arm bone, collarbone, and shoulder blade. Ligaments, muscles, and tendons help hold the joint in place and allow you to move your arm. A \"bursa\" is a small fluid-filled sac that sits near a bone. It cushions and protects nearby tissues when they rub on or slide over bones.  Graphic 907686 Version 1.0  figure 2: Pendulum exercises     Letyour arm relax and hang down while you sit or stand. Gently move your arm backand forth at your side, then side to side across your body, and then around insmall circles. Do this exercise for 5 minutes, 1 or 2 times a day. Youcan make this exercise harder by making bigger movements or holding a smallweight in your hand.  Graphic 689843 Version 1.0  Consumer Information Use and Disclaimer   Disclaimer: This generalized information is a limited summary of diagnosis, treatment, and/or medication information. It is not meant to be comprehensive and should be used as a tool to help the user understand and/or assess potential diagnostic and treatment options. It does NOT include all information about conditions, treatments, medications, side effects, or risks that may apply to a specific patient. It is not intended to be medical advice or a substitute for the medical advice, diagnosis, or treatment of a health care provider based on the health care provider's examination and assessment of a patient's specific and unique circumstances. Patients must speak with a " health care provider for complete information about their health, medical questions, and treatment options, including any risks or benefits regarding use of medications. This information does not endorse any treatments or medications as safe, effective, or approved for treating a specific patient. [employer] and its affiliates disclaim any warranty or liability relating to this information or the use thereof.The use of this information is governed by the Terms of Use, available at https://www.CareinSyncuwer.com/en/know/clinical-effectiveness-terms. 2024© [employer], Inc. and its affiliates and/or licensors. All rights reserved.  Copyright   © 2024 [employer], Inc. and/or its affiliates. All rights reserved.    Patient Education     Shoulder Rehab Exercises, Phase 1   About this topic   Shoulder rehab exercises are important after an injury or surgery. After a surgery, there may be up to 3 phases of exercises for your recovery. Your doctor will make a plan for what exercises you can do and when you can do them.  Phase 1 focuses on passive range of motion in the shoulder. This means a helper is moving your joint for you. You also will be exercising your elbow, wrist, and hand. Ask your doctor how long you will be doing these exercises before you move to the next phase.  General   Before starting with a program, ask your doctor if you are healthy enough to do these exercises. Your doctor may have you work with a  or physical therapist to make a safe exercise program to meet your needs.  Passive Exercises   These exercises have to be done with a helper. The helper does all the work and you completely relax your muscles. Your doctor will tell you how far your helper can move your joint. You may only be allowed to move your joint a certain amount. Your therapist will teach you and a helper how to do these exercises before you go home. Have your helper stand at the side of your operated arm.  Shoulder raises with  helper ? The helper places one hand on your shoulder to stabilize the joint and the other hand on your lower arm. The helper brings your arm straight up while keeping the elbow straight. The helper then returns your arm to your side.  Outward rotations with helper ? Lie on your back with your operated arm at your side. Bend your elbow and lay your lower arm on your stomach. Your helper will hold your wrist and hand in one hand. Your helper's other hand is against your upper arm to keep your elbow from moving away from your body. Your helper will bring your lower arm toward them and back to the stomach.  These exercises are also passive, but you can do them yourself. You use the movement of your body to move your arm. Do NOT use your shoulder muscles to move your arm. Hold onto a table with one hand and bend forward at the waist until your back is level with the table. Let your sore arm hang in front of you. Do each exercise for 1 minute.  Pendulum exercises:  Circles ? Move your body in large circles one way. After you move a few times, your arm should start gently moving in circles. Now, move your body in circles the other way until your arm moves the other way.  Swinging side-to-side ? Move your body side to side. After you move a few times, your arm should start gently moving side-to-side.  Swinging back and forth ? Move your body forwards and then backwards. After you move a few times, your arm should start gently moving back and forth.  Strengthening Exercises   Strengthening exercises keep your muscles firm and strong. Start by repeating each exercise 2 to 3 times. Work up to doing each exercise 10 times. Try to do the exercises 2 to 3 times each day. Do all exercises slowly.  Elbow bending ? With your arms at your sides with the palm facing up, bend your elbow up all the way. Now, straighten your elbows while pointing your palms down to the floor.  Wrist exercises:  Side-to-side ? Hold one arm still using  your other hand. Move your hand from side to side.  Up and down ? Hold one arm still using your other hand. Bend your wrist up and down.  Wrist circles ? Move each wrist in a [place] in one direction. Now, move each wrist in a [place] in the other direction.  Hand opening and closing ? Make a fist with your hand and then open your hand all the way.                 Helpful tips   Stay active and work out to keep your muscles strong and flexible.  Keep a healthy weight to avoid putting too much stress on your joints. Eat a healthy diet to keep your muscles healthy.  Be sure you do not hold your breath when exercising. This can raise your blood pressure. If you tend to hold your breath, try counting out loud when exercising. If any exercise bothers you, stop right away.  After exercising, it is a good idea to use ice. Place an ice pack or a bag of frozen peas wrapped in a towel over the painful part. Never put ice right on the skin. Do not leave the ice on more than 10 to 15 minutes at a time. Ice after activity may help decrease pain and swelling. Never ice before stretching.  Doing exercises before a meal may be a good way to get into a routine.  Exercise may be slightly uncomfortable, but you should not have sharp pains. If you do get sharp pains, stop what you are doing. If the sharp pains continue, call your doctor.  Last Reviewed Date   2021-05-21  Consumer Information Use and Disclaimer   This generalized information is a limited summary of diagnosis, treatment, and/or medication information. It is not meant to be comprehensive and should be used as a tool to help the user understand and/or assess potential diagnostic and treatment options. It does NOT include all information about conditions, treatments, medications, side effects, or risks that may apply to a specific patient. It is not intended to be medical advice or a substitute for the medical advice, diagnosis, or treatment of a health care provider based on  the health care provider's examination and assessment of a patient’s specific and unique circumstances. Patients must speak with a health care provider for complete information about their health, medical questions, and treatment options, including any risks or benefits regarding use of medications. This information does not endorse any treatments or medications as safe, effective, or approved for treating a specific patient. [employer] and its affiliates disclaim any warranty or liability relating to this information or the use thereof. The use of this information is governed by the Terms of Use, available at https://www.woltersReactivityuwer.com/en/know/clinical-effectiveness-terms   Copyright   Copyright © 2024 [employer], [employer] and its affiliates and/or licensors. All rights reserved.

## 2024-07-07 NOTE — LETTER
July 7, 2024     Patient: Celsa Sam   YOB: 2000   Date of Visit: 7/7/2024       To Whom It May Concern:    It is my medical opinion that Celsa Sam may return to light duty immediately with the following restrictions: No lifting over 5 pounds with left arm .    If you have any questions or concerns, please don't hesitate to call.         Sincerely,        Kevin Hernandez MD    CC: No Recipients

## 2024-07-07 NOTE — PROGRESS NOTES
[facility]        NAME: Celsa Sam is a 24 y.o. female  : 2000    MRN: [id number]  DATE: 2024  TIME: 12:56 PM    Assessment and Plan   Thoracic myofascial strain, initial encounter [S29.019A]  1. Thoracic myofascial strain, initial encounter  cyclobenzaprine (FLEXERIL) 5 mg tablet    Ambulatory Referral to Physical Therapy      2. Nontraumatic shoulder pain, left  cyclobenzaprine (FLEXERIL) 5 mg tablet    Ambulatory Referral to Physical Therapy            Patient Instructions     Patient Instructions   Gentle stretching as discussed.  Encourage pain free movement of the affected arm but do not attempt any activities that will cause pain in that arm / shoulder.  Pendulum exercise as discussed at least 4 times a day.  Use heat, as discussed, 10 minutes every 2 - 3 hours to increase circulation.      Patient Education     Upper Back Pain ED   General Information   You came to the Emergency Department (ED) for upper back pain. This is pain that happens anywhere from the bottom of your neck to the middle part of your back. You may have pain in your neck, arms, and shoulders as well. Upper back pain is not as common as low back pain. Most of the time, it will get better on its own.  The doctors may or may not know the exact cause of your pain. You may be waiting on some tests results. The staff will contact you if there are concerning results.  What care is needed at home?   Call your regular doctor to let them know you were in the ED. Make a follow-up appointment if you were told to.  Stay as active as you can without causing too much pain. It is OK to rest your back for a day or so. Be sure to get up and move around during the day as you are able. After a few days, slowly start to increase your activity level as you are able to. If something causes your pain to come back or get worse, stop and go back to doing easier activities that did not hurt.  If the doctors think your pain is  most likely from a strained muscle or injury, you can put ice on your back a few times a day for a day or 2. Wrap an ice pack in a towel and put it on your back for 10 to 15 minutes at a time. After 2 days, you may want to use heat on your back. You can try putting a heating pad or warm towel on your back for 20 minutes at a time a few times each day. Never go to sleep with heat or ice on your back.  Protect your back. Limit sports, twisting, and reaching until your back feels better.  Do not sit or  one position for a long time.  You may want to take medicines like ibuprofen or naproxen for swelling and pain. These are nonsteroidal anti-inflammatory drugs (NSAIDs).  When do I need to get emergency help?   Return to the ED if:   You have trouble breathing.  Your pain becomes severe.  You develop new weakness in one or both of your arms or you cannot move your arm.  When do I need to call the doctor?   Your arms are numb, weak, or tingly.  You have a fever of 100.4°F (38°C) or higher or chills or are coughing up mucus that looks green or yellow.  You are not able to do your daily activities because of the pain.  You are not able to sleep because of the pain.  You have new or worsening symptoms.  Last Reviewed Date   2021-06-02  Consumer Information Use and Disclaimer   This generalized information is a limited summary of diagnosis, treatment, and/or medication information. It is not meant to be comprehensive and should be used as a tool to help the user understand and/or assess potential diagnostic and treatment options. It does NOT include all information about conditions, treatments, medications, side effects, or risks that may apply to a specific patient. It is not intended to be medical advice or a substitute for the medical advice, diagnosis, or treatment of a health care provider based on the health care provider's examination and assessment of a patient’s specific and unique circumstances. Patients  "must speak with a health care provider for complete information about their health, medical questions, and treatment options, including any risks or benefits regarding use of medications. This information does not endorse any treatments or medications as safe, effective, or approved for treating a specific patient. Up[employer]Date, Inc. and its affiliates disclaim any warranty or liability relating to this information or the use thereof. The use of this information is governed by the Terms of Use, available at https://www.[employer].com/en/know/clinical-effectiveness-terms   Copyright   Copyright © 2024 UpToDate, Inc. and its affiliates and/or licensors. All rights reserved.  Patient Education     Shoulder pain   The Basics   Written by the doctors and editors at [employer]   What are the parts of the shoulder? -- The shoulder joint is made up of the upper arm bone, collarbone, and shoulder blade. It includes muscles, ligaments, tendons, and bursae (figure 1). All of these parts work together to help the shoulder move comfortably in different directions.  What can cause shoulder pain? -- Shoulder pain can happen when you damage or injure any of the different parts of the shoulder.  Different conditions can cause shoulder pain. They include:   Bursitis - This is a condition that can cause pain or swelling next to a joint. A \"bursa\" is a small fluid-filled sac that sits near a bone. It cushions and protects nearby tissues when they rub on or slide over bones. Bursitis is when a bursa gets irritated and swollen.   Frozen shoulder - This is a condition that causes the shoulder to be stiff, painful, and hard to move. If you have a frozen shoulder, the tissue around the shoulder joint gets thick and tight. This might happen after a shoulder gets injury or surgery.   Rotator cuff injury - The rotator cuff is made up of 4 shoulder muscles and their tendons. Tendons are strong bands of tissue that connect muscles to bones. Rotator " "cuff injuries cause pain in your shoulder and sometimes in your upper arm.   Shoulder impingement - This happens when a muscle, tendon, or bursa gets squeezed between bones.    shoulder - This happens when certain ligaments tear or get stretched too much. Ligaments are strong bands of tissue that connect bone to bone. The most common causes of a  shoulder are falling on the shoulder or getting hit in the shoulder. A  shoulder can be mild or severe, depending on how many ligaments are torn.   Osteoarthritis - This is a common condition that often comes with age. The cartilage in the joints wears down, causing the bones to rub against each other. This can cause pain, stiffness, and swelling in the shoulder joints.  Will I need tests? -- Maybe. Your doctor or nurse will talk with you and do an exam. They might also do an imaging test of your shoulder such as an X-ray, MRI, or ultrasound. Imaging tests create pictures of the inside of the body.  How is shoulder pain treated? -- Many causes of shoulder pain get better on their own. But it can take weeks to months to heal completely.  Your doctor might recommend:   Pain-relieving medicines called \"nonsteroidal anti-inflammatory drugs\" (NSAIDs) - These include ibuprofen (sample brand names: Advil, Motrin) and naproxen (sample brand name: Aleve). They can reduce pain and swelling.   Exercises and stretches - It can help to work with a physical therapist (exercise expert). They can teach you gentle stretches and exercises to help with your symptoms. Follow the physical therapist's advice for how often and when to do the exercises.   Steroid injections - Steroid medicines help reduce inflammation. Doctors can inject steroids into the shoulder to help reduce symptoms.   Surgery - Surgery might be an option if other treatments do not work and you have had symptoms for a long time.  Is there anything I can do on my own to feel better?    Rest your " shoulder - Avoid lifting things, reaching overhead or across your chest, or sleeping on the shoulder that hurts. If your doctor gave you a sling to support your arm, follow instructions for using it. Or you might get a bandage that goes around your shoulders and upper back instead.   Take medicine to reduce pain and swelling - To treat pain, you can take acetaminophen (sample brand name: Tylenol). To treat pain and swelling, you can take ibuprofen (sample brand names: Advil, Motrin).   Prop your shoulder on pillows - Keep it raised above the level of your heart. This can help with pain and swelling.   Ice your shoulder - Put a cold gel pack, bag of ice, or bag of frozen vegetables on the injured area every 1 to 2 hours, for 15 minutes each time. Put a thin towel between the ice (or other cold object) and your skin. Use the ice (or other cold object) for at least 6 hours after your injury. Some people find it helpful to ice longer, even up to 2 days after their injury. Ice can also be helpful after doing shoulder exercises.   Put heat on the area to reduce pain and stiffness - Do not use heat for more than 20 minutes at a time. Also, do not use anything too hot that could burn your skin.   Do pendulum exercises to keep your shoulder from getting too stiff (figure 2):   Let your arm relax and hang down while you sit or stand. Gently move your arm:   Back and forth at your side   Side to side across your body   Around in small circles   Do this exercise for 5 minutes, 1 or 2 times a day.   Start slowly, and make the exercises harder over time. For example, make small circles with your arm at first. Over time, make bigger circles or hold weights in your hand.   Your doctor, nurse, or physical therapist can show you how to do other exercises to strengthen the muscles around your shoulder. They will tell you when to start them and how often to do them.   Before exercising your shoulder, warm up the muscles first. You can  "do this by taking a hot shower or bath, or using a heating pad. Some soreness is normal. If you have sharp, tearing, or worsening pain, stop what you're doing and let your doctor or nurse know.  When should I call the doctor? -- Call for emergency help right away (in the [place] and [place], call 9-1-1) if:   You have shoulder pain and start to have trouble breathing or bad chest discomfort.  Call the doctor or nurse for advice if:   You have very bad pain that is not helped by medicines.   Your hand or arm becomes weak or swollen.   Your fingers are numb, tingly, or blue or gray in color.  All topics are updated as new evidence becomes available and our peer review process is complete.  This topic retrieved from [employer] on: Apr 25, 2024.  Topic 104406 Version 3.0  Release: 32.4.2 - C32.114  © 2024 [employer]. and/or its affiliates. All rights reserved.  figure 1: Parts of the shoulder     These are the different parts of the shoulder as viewed from the front (A) and the back (B). The shoulder joint is made up of the upper arm bone, collarbone, and shoulder blade. Ligaments, muscles, and tendons help hold the joint in place and allow you to move your arm. A \"bursa\" is a small fluid-filled sac that sits near a bone. It cushions and protects nearby tissues when they rub on or slide over bones.  Graphic 239215 Version 1.0  figure 2: Pendulum exercises     Letyour arm relax and hang down while you sit or stand. Gently move your arm backand forth at your side, then side to side across your body, and then around insmall circles. Do this exercise for 5 minutes, 1 or 2 times a day. Youcan make this exercise harder by making bigger movements or holding a smallweight in your hand.  Graphic 955542 Version 1.0  Consumer Information Use and Disclaimer   Disclaimer: This generalized information is a limited summary of diagnosis, treatment, and/or medication information. It is not meant to be comprehensive and should be used as a " tool to help the user understand and/or assess potential diagnostic and treatment options. It does NOT include all information about conditions, treatments, medications, side effects, or risks that may apply to a specific patient. It is not intended to be medical advice or a substitute for the medical advice, diagnosis, or treatment of a health care provider based on the health care provider's examination and assessment of a patient's specific and unique circumstances. Patients must speak with a health care provider for complete information about their health, medical questions, and treatment options, including any risks or benefits regarding use of medications. This information does not endorse any treatments or medications as safe, effective, or approved for treating a specific patient. UpToDate, Inc. and its affiliates disclaim any warranty or liability relating to this information or the use thereof.The use of this information is governed by the Terms of Use, available at https://www.[employer].siXis/en/know/clinical-effectiveness-terms. 2024© UpToDate, Inc. and its affiliates and/or licensors. All rights reserved.  Copyright   © 2024 UpToDate, Inc. and/or its affiliates. All rights reserved.    Patient Education     Shoulder Rehab Exercises, Phase 1   About this topic   Shoulder rehab exercises are important after an injury or surgery. After a surgery, there may be up to 3 phases of exercises for your recovery. Your doctor will make a plan for what exercises you can do and when you can do them.  Phase 1 focuses on passive range of motion in the shoulder. This means a helper is moving your joint for you. You also will be exercising your elbow, wrist, and hand. Ask your doctor how long you will be doing these exercises before you move to the next phase.  General   Before starting with a program, ask your doctor if you are healthy enough to do these exercises. Your doctor may have you work with a  or  physical therapist to make a safe exercise program to meet your needs.  Passive Exercises   These exercises have to be done with a helper. The helper does all the work and you completely relax your muscles. Your doctor will tell you how far your helper can move your joint. You may only be allowed to move your joint a certain amount. Your therapist will teach you and a helper how to do these exercises before you go home. Have your helper stand at the side of your operated arm.  Shoulder raises with helper ? The helper places one hand on your shoulder to stabilize the joint and the other hand on your lower arm. The helper brings your arm straight up while keeping the elbow straight. The helper then returns your arm to your side.  Outward rotations with helper ? Lie on your back with your operated arm at your side. Bend your elbow and lay your lower arm on your stomach. Your helper will hold your wrist and hand in one hand. Your helper's other hand is against your upper arm to keep your elbow from moving away from your body. Your helper will bring your lower arm toward them and back to the stomach.  These exercises are also passive, but you can do them yourself. You use the movement of your body to move your arm. Do NOT use your shoulder muscles to move your arm. Hold onto a table with one hand and bend forward at the waist until your back is level with the table. Let your sore arm hang in front of you. Do each exercise for 1 minute.  Pendulum exercises:  Circles ? Move your body in large circles one way. After you move a few times, your arm should start gently moving in circles. Now, move your body in circles the other way until your arm moves the other way.  Swinging side-to-side ? Move your body side to side. After you move a few times, your arm should start gently moving side-to-side.  Swinging back and forth ? Move your body forwards and then backwards. After you move a few times, your arm should start gently  moving back and forth.  Strengthening Exercises   Strengthening exercises keep your muscles firm and strong. Start by repeating each exercise 2 to 3 times. Work up to doing each exercise 10 times. Try to do the exercises 2 to 3 times each day. Do all exercises slowly.  Elbow bending ? With your arms at your sides with the palm facing up, bend your elbow up all the way. Now, straighten your elbows while pointing your palms down to the floor.  Wrist exercises:  Side-to-side ? Hold one arm still using your other hand. Move your hand from side to side.  Up and down ? Hold one arm still using your other hand. Bend your wrist up and down.  Wrist circles ? Move each wrist in a [place] in one direction. Now, move each wrist in a [place] in the other direction.  Hand opening and closing ? Make a fist with your hand and then open your hand all the way.                 Helpful tips   Stay active and work out to keep your muscles strong and flexible.  Keep a healthy weight to avoid putting too much stress on your joints. Eat a healthy diet to keep your muscles healthy.  Be sure you do not hold your breath when exercising. This can raise your blood pressure. If you tend to hold your breath, try counting out loud when exercising. If any exercise bothers you, stop right away.  After exercising, it is a good idea to use ice. Place an ice pack or a bag of frozen peas wrapped in a towel over the painful part. Never put ice right on the skin. Do not leave the ice on more than 10 to 15 minutes at a time. Ice after activity may help decrease pain and swelling. Never ice before stretching.  Doing exercises before a meal may be a good way to get into a routine.  Exercise may be slightly uncomfortable, but you should not have sharp pains. If you do get sharp pains, stop what you are doing. If the sharp pains continue, call your doctor.  Last Reviewed Date   2021-05-21  Consumer Information Use and Disclaimer   This generalized information is  a limited summary of diagnosis, treatment, and/or medication information. It is not meant to be comprehensive and should be used as a tool to help the user understand and/or assess potential diagnostic and treatment options. It does NOT include all information about conditions, treatments, medications, side effects, or risks that may apply to a specific patient. It is not intended to be medical advice or a substitute for the medical advice, diagnosis, or treatment of a health care provider based on the health care provider's examination and assessment of a patient’s specific and unique circumstances. Patients must speak with a health care provider for complete information about their health, medical questions, and treatment options, including any risks or benefits regarding use of medications. This information does not endorse any treatments or medications as safe, effective, or approved for treating a specific patient. UpToDate, Inc. and its affiliates disclaim any warranty or liability relating to this information or the use thereof. The use of this information is governed by the Terms of Use, available at https://www.[employer].com/en/know/clinical-effectiveness-terms   Copyright   Copyright © 2024 UpToDate, Inc. and its affiliates and/or licensors. All rights reserved.      Follow up with PCP in 3-5 days.  Proceed to  ER if symptoms worsen.    Chief Complaint     Chief Complaint   Patient presents with    Back Pain     Started back pain about a week ago. Did not fall or have any impact to back. No OTC medications taken.          History of Present Illness       Patient complains of left upper back pain for the past week.  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  Patient denies history of PE or DVT.  She denies any recent calf pain or swelling.  She denies any chest pain or shortness of breath.  She is non-smoker and not on estrogen.  Her mother had a  pulmonary embolism after recovery from a leg injury with immobilization.        Review of Systems   Review of Systems   Constitutional:  Negative for activity change, chills and fever.   Respiratory:  Negative for shortness of breath.    Cardiovascular:  Negative for chest pain and leg swelling.   Gastrointestinal:  Negative for abdominal pain.   Musculoskeletal:  Positive for back pain. Negative for arthralgias, gait problem, joint swelling, myalgias, neck pain and neck stiffness.   Skin:  Negative for color change, rash and wound.   Neurological:  Negative for dizziness, syncope, weakness, light-headedness and headaches.         Current Medications       Current Outpatient Medications:     cyclobenzaprine (FLEXERIL) 5 mg tablet, Take 1 tablet (5 mg total) by mouth 3 (three) times a day as needed for muscle spasms, Disp: 20 tablet, Rfl: 0    naproxen (Naprosyn) 500 mg tablet, Take 1 tablet (500 mg total) by mouth 2 (two) times a day with meals for 10 days (Patient not taking: Reported on 7/7/2024), Disp: 20 tablet, Rfl: 0    ondansetron (ZOFRAN) 4 mg tablet, Take 1 tablet (4 mg total) by mouth every 8 (eight) hours as needed for nausea or vomiting for up to 20 doses (Patient not taking: Reported on 7/7/2024), Disp: 8 tablet, Rfl: 0    ondansetron (ZOFRAN) 4 mg tablet, Take 1 tablet (4 mg total) by mouth every 8 (eight) hours as needed for nausea or vomiting for up to 20 doses (Patient not taking: Reported on 7/7/2024), Disp: 20 tablet, Rfl: 0    promethazine-dextromethorphan (PHENERGAN-DM) 6.25-15 mg/5 mL oral syrup, TAKE 5 ML BY MOUTH 4 TIMES A DAY AS NEEDED (Patient not taking: Reported on 4/11/2024), Disp: , Rfl:     tobramycin (TOBREX) 0.3 % SOLN, Administer 1 drop to the right eye every 4 (four) hours while awake (Patient not taking: Reported on 4/11/2024), Disp: , Rfl:     Current Allergies     Allergies as of 07/07/2024    (No Known Allergies)            The following portions of the patient's history were  "reviewed and updated as appropriate: allergies, current medications, past family history, past medical history, past social history, past surgical history and problem list.     Past Medical History:   Diagnosis Date    Enlargement, spleen     Hydronephrosis        Past Surgical History:   Procedure Laterality Date    KIDNEY SURGERY      5 mths old       Family History   Problem Relation Age of Onset    No Known Problems Mother     No Known Problems Father          Medications have been verified.        Objective   /73   Pulse 88   Temp 97.9 °F (36.6 °C)   Resp 16   Ht 5' 2\" (1.575 m)   Wt 45.5 kg (100 lb 3.2 oz)   LMP 06/27/2024 (Approximate)   SpO2 95%   BMI 18.33 kg/m²        Physical Exam     Physical Exam  Vitals and nursing note reviewed.   Constitutional:       General: She is not in acute distress.     Appearance: She is well-developed. She is not ill-appearing.   HENT:      Head: Normocephalic and atraumatic.   Eyes:      Conjunctiva/sclera: Conjunctivae normal.      Pupils: Pupils are equal, round, and reactive to light.   Pulmonary:      Effort: Pulmonary effort is normal.      Breath sounds: Normal breath sounds.   Musculoskeletal:         General: Tenderness present. No swelling.      Cervical back: Normal range of motion and neck supple.      Right lower leg: No edema.      Left lower leg: No edema.      Comments: Tender left upper back at the insertions of infraspinatus and teres minor.  No deep calf swelling or tenderness, Homans negative   Skin:     General: Skin is warm and dry.      Findings: No rash.   Neurological:      Mental Status: She is alert and oriented to person, place, and time.      Deep Tendon Reflexes: Reflexes normal.   Psychiatric:         Mood and Affect: Mood normal.         Behavior: Behavior normal.         Thought Content: Thought content normal.         Judgment: Judgment normal.                   "

## 2024-07-16 ENCOUNTER — EVALUATION (OUTPATIENT)
Age: 24
End: 2024-07-16
Payer: COMMERCIAL

## 2024-07-16 DIAGNOSIS — M25.512 NONTRAUMATIC SHOULDER PAIN, LEFT: ICD-10-CM

## 2024-07-16 DIAGNOSIS — S29.019A THORACIC MYOFASCIAL STRAIN, INITIAL ENCOUNTER: ICD-10-CM

## 2024-07-16 PROCEDURE — 97112 NEUROMUSCULAR REEDUCATION: CPT | Performed by: PHYSICAL THERAPIST

## 2024-07-16 PROCEDURE — 97162 PT EVAL MOD COMPLEX 30 MIN: CPT | Performed by: PHYSICAL THERAPIST

## 2024-07-16 NOTE — PROGRESS NOTES
PT Evaluation     Today's date: 2024  Patient name: Celsa Sam  : 2000  MRN: [id number]  Referring provider: Kevin Hernandez MD  Dx:   Encounter Diagnosis     ICD-10-CM    1. Thoracic myofascial strain, initial encounter  S29.019A Ambulatory Referral to Physical Therapy      2. Nontraumatic shoulder pain, left  M25.512 Ambulatory Referral to Physical Therapy                     Assessment  Impairments: abnormal coordination, abnormal muscle firing, abnormal or restricted ROM, activity intolerance, impaired physical strength, lacks appropriate home exercise program, pain with function and poor posture   Symptom irritability: moderate    Assessment details: Celsa Sam presents to PT with 3mth Hx of L shoulder and thoracic pain with lifting at work. Significant findings from examination include: poor posture, scapular dyskinesis. These findings are consistent with scapular motor control deficit, limiting their ability to perform work lifting. Pt would benefit from course of PT to resolve the above mentioned impairments and facilitate return to PLOF.    Understanding of Dx/Px/POC: good     Prognosis: good    Goals  Short Term Functional Goals:   In 4 weeks patient will:   Decrease L shoulder pain to not >4/10 during 4hr work shift.   Increase AROM of L shoulder to WNL for performance of reach/carry tasks.   Patient will demonstrate 50% independence and compliance with HEP to maximize improvements in functional mobility.   Patient will demonstrate independence with proper posture, body mechanics, self pain management, and ADL modification.   pt will increase FOTO score by 10pts to reflect a statistically significant improvement.       Long Term Functional Goals (Goals for patient at discharge):    In 6 weeks patient will:   Decrease L shoulder pain to not >3/10 during 8hr work shift.   Pt will be able to lift children at work as needed and without difficulty.  pt will score at or  above predicted discharge FOTO score of 68 to reflect improvement in subjective functional ability.           Plan  Patient would benefit from: skilled physical therapy  Referral necessary: No    Planned therapy interventions: manual therapy, neuromuscular re-education, patient/caregiver education, therapeutic activities, therapeutic exercise and home exercise program    Frequency: 2x week  Duration in weeks: 6  Plan of Care beginning date: 2024  Plan of Care expiration date: 2024  Treatment plan discussed with: patient      Subjective Evaluation    History of Present Illness  Date of onset: 2024  Mechanism of injury: Celsa Sam is a 24 y.o. y/o female who reports 3mth Hx of L shoulder pain which started unclear JANE, but did start working at a new  around that time. CC is difficulty lifting children. Symptoms aggravated by any lifting children at work. Symptoms not improved by anything consistently. No difficulty sleeping. Denies numbness, burning, tingling.     Symptoms change throughout the day: No   Relevant Surgical Hx: None  Pacemaker: No   Cancer: No            Not a recurrent problem   Quality of life: good    Patient Goals  Patient goals for therapy: decreased pain, return to work, return to sport/leisure activities and increased strength    Pain  Current pain ratin    Social Support    Employment status: working ()      Objective     Postural Observations  Seated posture: poor  Standing posture: poor  Correction of posture: makes symptoms better    Additional Postural Observation Details  Protracted scapula and Ant tilt    Active Range of Motion   Cervical/Thoracic Spine       Cervical    Flexion:  WFL  Extension:  WFL  Left lateral flexion:  WFL  Right lateral flexion:  WFL  Left rotation:  WFL  Right rotation:  WFL    Thoracic    Flexion:  WFL  Extension:  with pain Restriction level: maximal  Left rotation:  Restriction level: minimal  Right rotation:  with  "pain Restriction level: moderate    Joint Play     Hypomobile: T4, T5, T6, T7, T8 and T9     Strength/Myotome Testing     Left Shoulder     Planes of Motion   Abduction: 4   External rotation at 0°: 4+     Right Shoulder     Planes of Motion   Abduction: 4   External rotation at 0°: 4+     Left Elbow   Flexion: 5  Extension: 5    Right Elbow   Flexion: 5  Extension: 5    Left Wrist/Hand   Wrist extension: 5  Wrist flexion: 5    Right Wrist/Hand   Wrist extension: 5  Wrist flexion: 5    Tests   Cervical   Positive neck flexor muscle endurance test.    Additional Tests Details  Medial boarder lift-off during arm lowering             Precautions: n/a     Daily Treatment Diary:      Initial Evaluation Date: 07/17/24  Compliance 7/16                     Visit Number 1                    Re-Eval  IE                 MC   Foto Captured y                           7/16                     Manual                                                                                        Ther-Ex                                                                                                                                                                                                                                                  Neuro Re-Ed                      K-taping  Application and edu 10'                     Wall postures 10x10\"                                                   Ther-Act                                                               Modalities                                                                                    "

## 2024-07-17 ENCOUNTER — APPOINTMENT (OUTPATIENT)
Age: 24
End: 2024-07-17
Payer: COMMERCIAL

## 2024-07-17 ENCOUNTER — OFFICE VISIT (OUTPATIENT)
Age: 24
End: 2024-07-17
Payer: COMMERCIAL

## 2024-07-17 VITALS
HEART RATE: 92 BPM | BODY MASS INDEX: 18.4 KG/M2 | HEIGHT: 62 IN | OXYGEN SATURATION: 96 % | DIASTOLIC BLOOD PRESSURE: 74 MMHG | TEMPERATURE: 98.5 F | SYSTOLIC BLOOD PRESSURE: 104 MMHG | RESPIRATION RATE: 16 BRPM | WEIGHT: 100 LBS

## 2024-07-17 DIAGNOSIS — S46.912A MUSCLE STRAIN OF LEFT SCAPULAR REGION, INITIAL ENCOUNTER: Primary | ICD-10-CM

## 2024-07-17 DIAGNOSIS — S49.92XA INJURY OF LEFT SHOULDER, INITIAL ENCOUNTER: ICD-10-CM

## 2024-07-17 PROCEDURE — G0382 LEV 3 HOSP TYPE B ED VISIT: HCPCS

## 2024-07-17 PROCEDURE — S9083 URGENT CARE CENTER GLOBAL: HCPCS

## 2024-07-17 PROCEDURE — 73030 X-RAY EXAM OF SHOULDER: CPT

## 2024-07-17 NOTE — PATIENT INSTRUCTIONS
Preliminary reading of left shoulder Xray: No acute fracture or dislocation  Radiologist will have final reading- if that is different I will call you.    Ice to affected area first 48 hours, heat afterwards. 15 minutes every 3 hours as needed throughout the day.  Ice or heat to affected area - 15 minutes every 3 hours as needed throughout the day.  Topical pain medication such as icy/hot, Biofreeze, Salon pas, etc.  Ibuprofen - 600 mg orally every 6-8 hours when required, maximum 2400 mg/day OR Naproxen - 500 mg orally twice daily when required, maximum 1250 mg/day    Acetaminophen - 650 mg orally every 4-6 hours when required, maximum 4000 mg/day  Continue to follow up with Physical therapy as you have been doing but let them know you did have a fall today where you landed on your left side.    Follow up with Primary Care Provider in 3-5 days if not improving.  Proceed to Emergency Department if symptoms worsen.    If tests have been performed at [facility], our office will contact you with results if changes need to be made to the care plan discussed with you at the visit.  You can review your full results on [facility] MyChart.

## 2024-07-17 NOTE — PROGRESS NOTES
[facility]        NAME: Celsa Sam is a 24 y.o. female  : 2000    MRN: [id number]  DATE: 2024  TIME: 2:22 PM    Assessment and Plan   Muscle strain of left scapular region, initial encounter [S46.912A]  1. Muscle strain of left scapular region, initial encounter  XR shoulder 2+ vw left            Patient Instructions   Preliminary reading of left shoulder Xray: No acute fracture or dislocation  Radiologist will have final reading- if that is different I will call you.    Ice to affected area first 48 hours, heat afterwards. 15 minutes every 3 hours as needed throughout the day.  Ice or heat to affected area - 15 minutes every 3 hours as needed throughout the day.  Topical pain medication such as icy/hot, Biofreeze, Salon pas, etc.  Ibuprofen - 600 mg orally every 6-8 hours when required, maximum 2400 mg/day OR Naproxen - 500 mg orally twice daily when required, maximum 1250 mg/day    Acetaminophen - 650 mg orally every 4-6 hours when required, maximum 4000 mg/day  Continue to follow up with Physical therapy as you have been doing but let them know you did have a fall today where you landed on your left side.    Follow up with Primary Care Provider in 3-5 days if not improving.  Proceed to Emergency Department if symptoms worsen.    If tests have been performed at [facility], our office will contact you with results if changes need to be made to the care plan discussed with you at the visit.  [facility]    Chief Complaint     Chief Complaint   Patient presents with   • Shoulder Pain     Fell down in a bathroom at a [employer] restaurant about 30 minutes ago onto LEFT shoulder, 8/10 pain. Hx of rotator cuff injury, currently in PT.          History of Present Illness       Was in the bathroom a the[place] about 30 minutes ago states she slipped and fell and landed on her left side.  She is having pain around her left scapula in the same area  which she says she has been having pain previously.  Did not hit her head, denies any lightheadedness or dizziness at this time.    Shoulder Pain   Pertinent negatives include no fever or numbness.       Review of Systems   Review of Systems   Constitutional:  Negative for chills and fever.   HENT:  Negative for ear pain and sore throat.    Eyes:  Negative for pain and visual disturbance.   Respiratory:  Negative for cough and shortness of breath.    Cardiovascular:  Negative for chest pain and palpitations.   Gastrointestinal:  Negative for abdominal pain and vomiting.   Genitourinary:  Negative for dysuria and hematuria.   Musculoskeletal:  Positive for myalgias (Left scapula). Negative for arthralgias and back pain.   Skin:  Negative for color change and rash.   Neurological:  Negative for dizziness, seizures, syncope, weakness and numbness.   All other systems reviewed and are negative.        Current Medications       Current Outpatient Medications:   •  cyclobenzaprine (FLEXERIL) 5 mg tablet, Take 1 tablet (5 mg total) by mouth 3 (three) times a day as needed for muscle spasms, Disp: 20 tablet, Rfl: 0  •  naproxen (Naprosyn) 500 mg tablet, Take 1 tablet (500 mg total) by mouth 2 (two) times a day with meals for 10 days (Patient not taking: Reported on 7/7/2024), Disp: 20 tablet, Rfl: 0  •  ondansetron (ZOFRAN) 4 mg tablet, Take 1 tablet (4 mg total) by mouth every 8 (eight) hours as needed for nausea or vomiting for up to 20 doses (Patient not taking: Reported on 7/7/2024), Disp: 8 tablet, Rfl: 0  •  ondansetron (ZOFRAN) 4 mg tablet, Take 1 tablet (4 mg total) by mouth every 8 (eight) hours as needed for nausea or vomiting for up to 20 doses (Patient not taking: Reported on 7/7/2024), Disp: 20 tablet, Rfl: 0  •  promethazine-dextromethorphan (PHENERGAN-DM) 6.25-15 mg/5 mL oral syrup, TAKE 5 ML BY MOUTH 4 TIMES A DAY AS NEEDED (Patient not taking: Reported on 4/11/2024), Disp: , Rfl:   •  tobramycin (TOBREX)  "0.3 % SOLN, Administer 1 drop to the right eye every 4 (four) hours while awake (Patient not taking: Reported on 4/11/2024), Disp: , Rfl:     Current Allergies     Allergies as of 07/17/2024   • (No Known Allergies)            The following portions of the patient's history were reviewed and updated as appropriate: allergies, current medications, past family history, past medical history, past social history, past surgical history and problem list.     Past Medical History:   Diagnosis Date   • Enlargement, spleen    • Hydronephrosis        Past Surgical History:   Procedure Laterality Date   • KIDNEY SURGERY      5 mths old       Family History   Problem Relation Age of Onset   • No Known Problems Mother    • No Known Problems Father          Medications have been verified.        Objective   /74   Pulse 92   Temp 98.5 °F (36.9 °C)   Resp 16   Ht 5' 2\" (1.575 m)   Wt 45.4 kg (100 lb)   LMP 06/27/2024 (Approximate)   SpO2 96%   BMI 18.29 kg/m²   Patient's last menstrual period was 06/27/2024 (approximate).       Physical Exam     Physical Exam  Vitals and nursing note reviewed.   Constitutional:       Appearance: Normal appearance.   HENT:      Head: Normocephalic and atraumatic.   Pulmonary:      Effort: Pulmonary effort is normal.   Musculoskeletal:      Left shoulder: Normal. No tenderness or bony tenderness. Normal range of motion.        Arms:       Cervical back: No bony tenderness.      Thoracic back: Tenderness (Left scapula region) present. No bony tenderness.      Lumbar back: No bony tenderness.   Skin:     General: Skin is warm and dry.      Capillary Refill: Capillary refill takes less than 2 seconds.   Neurological:      General: No focal deficit present.      Mental Status: She is alert and oriented to person, place, and time. Mental status is at baseline.      Sensory: No sensory deficit.      Motor: No weakness.   Psychiatric:         Mood and Affect: Mood normal.         Behavior: " Behavior normal.         Thought Content: Thought content normal.

## 2024-07-18 ENCOUNTER — OFFICE VISIT (OUTPATIENT)
Age: 24
End: 2024-07-18
Payer: COMMERCIAL

## 2024-07-18 DIAGNOSIS — M25.512 NONTRAUMATIC SHOULDER PAIN, LEFT: ICD-10-CM

## 2024-07-18 DIAGNOSIS — S29.019A THORACIC MYOFASCIAL STRAIN, INITIAL ENCOUNTER: Primary | ICD-10-CM

## 2024-07-18 PROCEDURE — 97112 NEUROMUSCULAR REEDUCATION: CPT

## 2024-07-18 PROCEDURE — 97110 THERAPEUTIC EXERCISES: CPT

## 2024-07-18 NOTE — PROGRESS NOTES
"Daily Note     Today's date: 2024  Patient name: Celsa Sam  : 2000  MRN: [id number]  Referring provider: Kevin Hernandez MD  Dx:   Encounter Diagnosis     ICD-10-CM    1. Thoracic myofascial strain, initial encounter  S29.019A       2. Nontraumatic shoulder pain, left  M25.512           Start Time: 1600  Stop Time: 1646  Total time in clinic (min): 46 minutes    Subjective: Pt reports recent slip and fall resulting in worsening of pain symptoms.  Pain most located L rachel-scapularly - 8/10 pain intensity.        Objective: See treatment diary below      Assessment: Tolerated treatment well. Patient would benefit from continued PT.  No significant injury from recent fall.  Pt able to tolerate introduction of new therapeutic interventions in the POC this visit.  Moderate discomfort noted throughout visit, but pain symptoms improved during visit and after tx session.  Pt requires consistent VC's and tactile cues to perform scapular targeted interventions correctly due to poor scapular motor control.  1:1 with Mikey Todd DPT entirety of tx.        Plan: Continue per plan of care.  Progress treatment as tolerated.       Precautions: n/a     Daily Treatment Diary:      Initial Evaluation Date: 24  Compliance                    Visit Number 1 2                   Re-Eval  IE                 MC   Foto Captured y                                              Manual                                                                                        Ther-Ex                      UBE  3'/3'                    Rows  3x10 purple                   Sh ext  3x10 green                   S/L Sh flex  15x B                    S/L Sh abd  15x B                                                                                                                                 Neuro Re-Ed                      K-taping  Application and edu 10'                     Wall postures 10x10\"      " "      Serratus punches  Supine 20x3\" 2#           Prone I, T, Y  15x ea B           Wall slide + lift off  2x10           Seated T/s ext  20x3\"                                                  Ther-Act                                                               Modalities                                                                                    "

## 2024-07-18 NOTE — LETTER
July 18, 2024     Patient: Celsa Sam  YOB: 2000  Date of Visit: 7/18/2024      To Whom it May Concern:    Celsa Sam is under my professional care. Celsa was seen in my office on 7/18/2024.    If you have any questions or concerns, please don't hesitate to call.          Sincerely,          Brad Todd, PT        CC:   No Recipients

## 2024-07-22 ENCOUNTER — OFFICE VISIT (OUTPATIENT)
Age: 24
End: 2024-07-22
Payer: COMMERCIAL

## 2024-07-22 DIAGNOSIS — M25.512 NONTRAUMATIC SHOULDER PAIN, LEFT: ICD-10-CM

## 2024-07-22 DIAGNOSIS — S29.019A THORACIC MYOFASCIAL STRAIN, INITIAL ENCOUNTER: Primary | ICD-10-CM

## 2024-07-22 PROCEDURE — 97110 THERAPEUTIC EXERCISES: CPT | Performed by: PHYSICAL THERAPIST

## 2024-07-22 PROCEDURE — 97112 NEUROMUSCULAR REEDUCATION: CPT | Performed by: PHYSICAL THERAPIST

## 2024-07-22 NOTE — LETTER
July 22, 2024     Patient: Celsa Sam  YOB: 2000  Date of Visit: 7/22/2024      To Whom it May Concern:    Celsa Sam is under my professional care. Celsa was seen in my office on 7/22/2024.    If you have any questions or concerns, please don't hesitate to call.          Sincerely,          Franco Burton, PT        CC: No Recipients

## 2024-07-22 NOTE — PROGRESS NOTES
Daily Note     Today's date: 2024  Patient name: Celsa Sam  : 2000  MRN: [id number]  Referring provider: Kevin Hernandez MD  Dx:   Encounter Diagnosis     ICD-10-CM    1. Thoracic myofascial strain, initial encounter  S29.019A       2. Nontraumatic shoulder pain, left  M25.512           Start Time: 1610  Stop Time: 1700  Total time in clinic (min): 50 minutes    Subjective: Pt reports that she had some muscular soreness following last session, but that it has since subsided. Reports 5/10 pain currently (improved from 8/10 at start of last session) and that nothing aggravated her symptoms over the weekend. Notes some increased upper back/shoulder pain from work today.      Objective: See treatment diary below      Assessment: Pt tolerated today's treatment well, noting muscular fatigue with activity. Progressed with exercises to increase demand on scapular stabilizing mm. Pt required consistent verbal and tactile cueing for scapular control, as well as posture during exercise. Pt would benefit from continued PT to address motor control of scapular mm and to facilitate return to PLOF.      Plan: Continue per plan of care. Progress interventions as tolerated.     Precautions: n/a     Daily Treatment Diary:      Initial Evaluation Date: 24  Compliance                  Visit Number 1 2  3                 Re-Eval  IE                 MC   Foto Captured y                                            Manual                                                                                        Ther-Ex                      UBE  3'/3'  3'/3'                 Rows  3x10 purple  3x10 purple                 Sh ext  3x10 green  3x10 green                 S/L Sh flex  15x B   Supine full arc    15x B                 S/L Sh abd  15x B                   S/L Sh ER    15x B  15x 1# B                                                                                             "             Neuro Re-Ed                      K-taping  Application and edu 10'                     Wall postures 10x10\"            Serratus punches  Supine 20x3\" 2# Angled at table  [id number]\"          Prone I, T, Y  15x ea B 1# 15x ea B          Wall slide + lift off  2x10 2x10          Seated T/s ext  20x3\" 20x3\"                                                 Ther-Act                                                               Modalities                                                                                      "

## 2024-07-23 ENCOUNTER — APPOINTMENT (OUTPATIENT)
Age: 24
End: 2024-07-23
Payer: COMMERCIAL

## 2024-07-25 ENCOUNTER — OFFICE VISIT (OUTPATIENT)
Age: 24
End: 2024-07-25
Payer: COMMERCIAL

## 2024-07-25 ENCOUNTER — APPOINTMENT (OUTPATIENT)
Age: 24
End: 2024-07-25
Payer: COMMERCIAL

## 2024-07-25 VITALS
TEMPERATURE: 97.6 F | RESPIRATION RATE: 14 BRPM | OXYGEN SATURATION: 99 % | HEART RATE: 95 BPM | HEIGHT: 62 IN | DIASTOLIC BLOOD PRESSURE: 78 MMHG | BODY MASS INDEX: 18.66 KG/M2 | SYSTOLIC BLOOD PRESSURE: 102 MMHG | WEIGHT: 101.4 LBS

## 2024-07-25 DIAGNOSIS — J06.9 VIRAL URI: ICD-10-CM

## 2024-07-25 DIAGNOSIS — J02.9 SORE THROAT: Primary | ICD-10-CM

## 2024-07-25 LAB — S PYO AG THROAT QL: NEGATIVE

## 2024-07-25 PROCEDURE — S9083 URGENT CARE CENTER GLOBAL: HCPCS | Performed by: EMERGENCY MEDICINE

## 2024-07-25 PROCEDURE — G0382 LEV 3 HOSP TYPE B ED VISIT: HCPCS | Performed by: EMERGENCY MEDICINE

## 2024-07-25 PROCEDURE — 87880 STREP A ASSAY W/OPTIC: CPT | Performed by: EMERGENCY MEDICINE

## 2024-07-25 RX ORDER — PREDNISONE 10 MG/1
TABLET ORAL
Qty: 19 TABLET | Refills: 0 | Status: SHIPPED | OUTPATIENT
Start: 2024-07-25

## 2024-07-25 NOTE — PATIENT INSTRUCTIONS
You have been diagnosed with a Viral Upper Respiratory infection and your symptoms should resolve over the next 7 to 10 days with the treatments recommended today.  If they do not, it is possible that you have developed a bacterial infection and you should return. If you were to take an antibiotic while you are still in the viral stage, you will not get better any faster, but could kill off many of the good germs in your body as well as make the germs in you resistant to the antibiotic.    Take an expectorant - guaifenesin should be the only ingredient - during the day, and the cough suppressant (ex. Robitussin DM or Tessalon) if needed at night only.   Take Zinc 25 mg every 12 hours for the next week (the dose is important so do not just take a multivitamin with zinc or an over-the-counter cold med with zinc such as Airborne or Zicam, as that will not give you the sufficient dose).    You should also take Vitamin D3 2000 i.u.s per day for the next 1 week, and Vitamin-C 500 mg twice daily (and again dosages are important, do not think you are getting enough vitamin C just by drinking extra orange juice).  You may use Flonase as discussed.  You may also take a decongestant like Sudafed, unless you have hypertension or cardiac disease. Avoid nasal sprays like Afrin or other vasoconstrictors.  If you are diabetic you should adhere strictly to your diabetic diet and monitor blood sugar closely while on prednisone and you should discontinue the prednisone if blood sugar becomes significantly elevated.  Avoid nonsteroidal anti-inflammatories like Advil or Aleve while on prednisone.       Although bothersome, mucous is not necessarily a bad thing. Production of mucous is the body's way of trying to capture and flush irritants from mucosal surfaces. Yellow or green mucous does not necessarily mean you have a bacterial infection. Mucous will become more discolored over time, especially first thing in the morning, as your  body's immune system floods the mucosal surfaces with white bloods cells to try and help fight infection. This white blood cell debride can also cause mucous to be discolored. Again, using nasal saline spray frequently may help soothe and keep mucous flowing out versus getting dried, thickened and / or stuck leading to more sinus pain and pressure.      If you have a cough, please realize that a cough is not necessarily a bad thing. It is a part of your body's protection mechanism to help keep your airways clear. Phlegm may be more discolored in the morning. Please note that discolored phlegm does not necessarily mean a bacterial infection.        Upper Respiratory Infection   AMBULATORY CARE:   An upper respiratory infection  is also called a cold. Your nose, throat, ears, and sinuses may be affected. You are more likely to get a cold in the winter. Your risk of getting a cold may be increased if you smoke cigarettes or have allergies, such as hay fever.  What causes a cold?  A cold is caused by a virus. Many viruses can cause a cold, and each is contagious. This means the virus can be easily spread to another person when the sick person coughs or sneezes. The virus can also be spread if you touch an object the virus is on and then touch your eyes, mouth, or nose.  Cold symptoms  are usually worst for the first 3 to 5 days. You may have any of the following:  Runny or stuffy nose    Sneezing and coughing    Sore throat or hoarseness    Red, watery, and sore eyes    Fatigue (you feel more tired than usual)    Chills and fever    Headache, body aches, or sore muscles    Call your local emergency number (911 in the [place]) if:   You have chest pain or trouble breathing.      Seek care immediately if:   You have a fever over 102ºF (39ºC).      Call your doctor if:   You have a low fever.    Your sore throat gets worse or you see white or yellow spots in your throat.    Your symptoms get worse after 3 to 5 days or are not  better in 14 days.    You have a rash anywhere on your skin.    You have large, tender lumps in your neck.    You have thick, green, or yellow drainage from your nose.    You cough up thick yellow, green, or bloody mucus.    You have a bad earache.    You have questions or concerns about your condition or care.    Treatment:  Colds are caused by viruses and do not get better with antibiotics. Most people get better in 7 to 14 days. You may continue to cough for 2 to 3 weeks. The following may help decrease your symptoms:  Decongestants  help reduce nasal congestion and help you breathe more easily. If you take decongestant pills, they may make you feel restless or not able to sleep. Do not use decongestant sprays for more than a few days.    Cough suppressants  help reduce coughing. Ask your healthcare provider which type of cough medicine is best for you.     NSAIDs , such as ibuprofen, help decrease swelling, pain, and fever. NSAIDs can cause stomach bleeding or kidney problems in certain people. If you take blood thinner medicine, always ask your healthcare provider if NSAIDs are safe for you. Always read the medicine label and follow directions.    Acetaminophen  decreases pain and fever. It is available without a doctor's order. Ask how much to take and how often to take it. Follow directions. Read the labels of all other medicines you are using to see if they also contain acetaminophen, or ask your doctor or pharmacist. Acetaminophen can cause liver damage if not taken correctly. Do not use more than 4 grams (4,000 milligrams) total of acetaminophen in one day.    Manage a cold:   Rest as much as possible.  Slowly start to do more each day.    Drink more liquids as directed.  Liquids will help thin and loosen mucus so you can cough it up. Liquids will also help prevent dehydration. Liquids that help prevent dehydration include water, fruit juice, and broth. Do not drink liquids that contain caffeine. Caffeine  can increase your risk for dehydration. Ask your healthcare provider how much liquid to drink each day.    Soothe a sore throat.  Gargle with warm salt water. Make salt water by dissolving ¼ teaspoon salt in 1 cup warm water. You may also suck on hard candy or throat lozenges. You may use a sore throat spray.    Use a humidifier or vaporizer.  Use a cool mist humidifier or a vaporizer to increase air moisture in your home. This may make it easier for you to breathe and help decrease your cough.    Use saline nasal drops as directed.  These help relieve congestion.    Apply petroleum-based jelly around the outside of your nostrils.  This can decrease irritation from blowing your nose.    Do not smoke.  Nicotine and other chemicals in cigarettes and cigars can make your symptoms worse. They can also cause infections such as bronchitis or pneumonia. Ask your healthcare provider for information if you currently smoke and need help to quit. E-cigarettes or smokeless tobacco still contain nicotine. Talk to your healthcare provider before you use these products.    Prevent a cold:   Wash your hands often.  Use soap and water every time you wash your hands. Rub your soapy hands together, lacing your fingers. Use the fingers of one hand to scrub under the nails of the other hand. Wash for at least 20 seconds. Rinse with warm, running water for several seconds. Then dry your hands. Use germ-killing gel if soap and water are not available. Do not touch your eyes or mouth without washing your hands first.     Cover a sneeze or cough.  Use a tissue that covers your mouth and nose. Put the used tissue in the trash right away. Use the bend of your arm if a tissue is not available. Wash your hands well with soap and water or use a hand . Do not stand close to anyone who is sneezing or coughing.    Try to stay away from others while you are sick.  This is especially important during the first 2 to 3 days when the virus is  more easily spread. Wait until a fever, cough, or other symptoms are gone before you return to work or other regular activities.    Do not share items while you are sick.  This includes food, drinks, eating utensils, and dishes.    Follow up with your doctor as directed:  Write down your questions so you remember to ask them during your visits.  © Copyright [employer] 2022 Information is for End User's use only and may not be sold, redistributed or otherwise used for commercial purposes. All illustrations and images included in CareNotes® are the copyrighted property of [employer] or [employer]  The above information is an  only. It is not intended as medical advice for individual conditions or treatments. Talk to your doctor, nurse or pharmacist before following any medical regimen to see if it is safe and effective for you.

## 2024-07-25 NOTE — PROGRESS NOTES
[facility]        NAME: Celsa Sam is a 24 y.o. female  : 2000    MRN: [id number]  DATE: 2024  TIME: 11:37 AM    Assessment and Plan   Sore throat [J02.9]  1. Sore throat  POCT rapid ANTIGEN strepA    predniSONE 10 mg tablet      2. Viral URI  predniSONE 10 mg tablet            Patient Instructions     Patient Instructions   You have been diagnosed with a Viral Upper Respiratory infection and your symptoms should resolve over the next 7 to 10 days with the treatments recommended today.  If they do not, it is possible that you have developed a bacterial infection and you should return. If you were to take an antibiotic while you are still in the viral stage, you will not get better any faster, but could kill off many of the good germs in your body as well as make the germs in you resistant to the antibiotic.    Take an expectorant - guaifenesin should be the only ingredient - during the day, and the cough suppressant (ex. Robitussin DM or Tessalon) if needed at night only.   Take Zinc 25 mg every 12 hours for the next week (the dose is important so do not just take a multivitamin with zinc or an over-the-counter cold med with zinc such as Airborne or Zicam, as that will not give you the sufficient dose).    You should also take Vitamin D3 2000 i.u.s per day for the next 1 week, and Vitamin-C 500 mg twice daily (and again dosages are important, do not think you are getting enough vitamin C just by drinking extra orange juice).  You may use Flonase as discussed.  You may also take a decongestant like Sudafed, unless you have hypertension or cardiac disease. Avoid nasal sprays like Afrin or other vasoconstrictors.  If you are diabetic you should adhere strictly to your diabetic diet and monitor blood sugar closely while on prednisone and you should discontinue the prednisone if blood sugar becomes significantly elevated.  Avoid nonsteroidal anti-inflammatories like Advil or Aleve  while on prednisone.       Although bothersome, mucous is not necessarily a bad thing. Production of mucous is the body's way of trying to capture and flush irritants from mucosal surfaces. Yellow or green mucous does not necessarily mean you have a bacterial infection. Mucous will become more discolored over time, especially first thing in the morning, as your body's immune system floods the mucosal surfaces with white bloods cells to try and help fight infection. This white blood cell debride can also cause mucous to be discolored. Again, using nasal saline spray frequently may help soothe and keep mucous flowing out versus getting dried, thickened and / or stuck leading to more sinus pain and pressure.      If you have a cough, please realize that a cough is not necessarily a bad thing. It is a part of your body's protection mechanism to help keep your airways clear. Phlegm may be more discolored in the morning. Please note that discolored phlegm does not necessarily mean a bacterial infection.        Upper Respiratory Infection   AMBULATORY CARE:   An upper respiratory infection  is also called a cold. Your nose, throat, ears, and sinuses may be affected. You are more likely to get a cold in the winter. Your risk of getting a cold may be increased if you smoke cigarettes or have allergies, such as hay fever.  What causes a cold?  A cold is caused by a virus. Many viruses can cause a cold, and each is contagious. This means the virus can be easily spread to another person when the sick person coughs or sneezes. The virus can also be spread if you touch an object the virus is on and then touch your eyes, mouth, or nose.  Cold symptoms  are usually worst for the first 3 to 5 days. You may have any of the following:  Runny or stuffy nose    Sneezing and coughing    Sore throat or hoarseness    Red, watery, and sore eyes    Fatigue (you feel more tired than usual)    Chills and fever    Headache, body aches, or sore  muscles    Call your local emergency number (911 in the [place]) if:   You have chest pain or trouble breathing.      Seek care immediately if:   You have a fever over 102ºF (39ºC).      Call your doctor if:   You have a low fever.    Your sore throat gets worse or you see white or yellow spots in your throat.    Your symptoms get worse after 3 to 5 days or are not better in 14 days.    You have a rash anywhere on your skin.    You have large, tender lumps in your neck.    You have thick, green, or yellow drainage from your nose.    You cough up thick yellow, green, or bloody mucus.    You have a bad earache.    You have questions or concerns about your condition or care.    Treatment:  Colds are caused by viruses and do not get better with antibiotics. Most people get better in 7 to 14 days. You may continue to cough for 2 to 3 weeks. The following may help decrease your symptoms:  Decongestants  help reduce nasal congestion and help you breathe more easily. If you take decongestant pills, they may make you feel restless or not able to sleep. Do not use decongestant sprays for more than a few days.    Cough suppressants  help reduce coughing. Ask your healthcare provider which type of cough medicine is best for you.     NSAIDs , such as ibuprofen, help decrease swelling, pain, and fever. NSAIDs can cause stomach bleeding or kidney problems in certain people. If you take blood thinner medicine, always ask your healthcare provider if NSAIDs are safe for you. Always read the medicine label and follow directions.    Acetaminophen  decreases pain and fever. It is available without a doctor's order. Ask how much to take and how often to take it. Follow directions. Read the labels of all other medicines you are using to see if they also contain acetaminophen, or ask your doctor or pharmacist. Acetaminophen can cause liver damage if not taken correctly. Do not use more than 4 grams (4,000 milligrams) total of acetaminophen in  one day.    Manage a cold:   Rest as much as possible.  Slowly start to do more each day.    Drink more liquids as directed.  Liquids will help thin and loosen mucus so you can cough it up. Liquids will also help prevent dehydration. Liquids that help prevent dehydration include water, fruit juice, and broth. Do not drink liquids that contain caffeine. Caffeine can increase your risk for dehydration. Ask your healthcare provider how much liquid to drink each day.    Soothe a sore throat.  Gargle with warm salt water. Make salt water by dissolving ¼ teaspoon salt in 1 cup warm water. You may also suck on hard candy or throat lozenges. You may use a sore throat spray.    Use a humidifier or vaporizer.  Use a cool mist humidifier or a vaporizer to increase air moisture in your home. This may make it easier for you to breathe and help decrease your cough.    Use saline nasal drops as directed.  These help relieve congestion.    Apply petroleum-based jelly around the outside of your nostrils.  This can decrease irritation from blowing your nose.    Do not smoke.  Nicotine and other chemicals in cigarettes and cigars can make your symptoms worse. They can also cause infections such as bronchitis or pneumonia. Ask your healthcare provider for information if you currently smoke and need help to quit. E-cigarettes or smokeless tobacco still contain nicotine. Talk to your healthcare provider before you use these products.    Prevent a cold:   Wash your hands often.  Use soap and water every time you wash your hands. Rub your soapy hands together, lacing your fingers. Use the fingers of one hand to scrub under the nails of the other hand. Wash for at least 20 seconds. Rinse with warm, running water for several seconds. Then dry your hands. Use germ-killing gel if soap and water are not available. Do not touch your eyes or mouth without washing your hands first.     Cover a sneeze or cough.  Use a tissue that covers your mouth  "and nose. Put the used tissue in the trash right away. Use the bend of your arm if a tissue is not available. Wash your hands well with soap and water or use a hand . Do not stand close to anyone who is sneezing or coughing.    Try to stay away from others while you are sick.  This is especially important during the first 2 to 3 days when the virus is more easily spread. Wait until a fever, cough, or other symptoms are gone before you return to work or other regular activities.    Do not share items while you are sick.  This includes food, drinks, eating utensils, and dishes.    Follow up with your doctor as directed:  Write down your questions so you remember to ask them during your visits.  © Copyright [employer] 2022 Information is for End User's use only and may not be sold, redistributed or otherwise used for commercial purposes. All illustrations and images included in CareNotes® are the copyrighted property of [employer] or [employer]  The above information is an  only. It is not intended as medical advice for individual conditions or treatments. Talk to your doctor, nurse or pharmacist before following any medical regimen to see if it is safe and effective for you.      Follow up with PCP in 3-5 days.  Proceed to  ER if symptoms worsen.    Chief Complaint     Chief Complaint   Patient presents with    Sore Throat     States has sore throat \" feels like she is getting strep throat\" Also has headache and runny nose, also states having difficulty breathing   States symptoms started last night          History of Present Illness       Patient complains of sore throat, headaches and congestion since last night.    Sore Throat   Associated symptoms include congestion and headaches. Pertinent negatives include no coughing, shortness of breath or trouble swallowing.       Review of Systems   Review of Systems   Constitutional:  Negative for appetite change, chills, fatigue and " fever.   HENT:  Positive for congestion and sore throat. Negative for rhinorrhea, sinus pressure, trouble swallowing and voice change.    Respiratory:  Negative for cough, chest tightness, shortness of breath and wheezing.    Cardiovascular:  Negative for chest pain.   Gastrointestinal:  Negative for nausea.   Musculoskeletal:  Negative for myalgias.   Neurological:  Positive for headaches.         Current Medications       Current Outpatient Medications:     cyclobenzaprine (FLEXERIL) 5 mg tablet, Take 1 tablet (5 mg total) by mouth 3 (three) times a day as needed for muscle spasms, Disp: 20 tablet, Rfl: 0    predniSONE 10 mg tablet, Take once daily all days pills on this schedule 4 -4 -3 -3 -2- 2 -1, Disp: 19 tablet, Rfl: 0    naproxen (Naprosyn) 500 mg tablet, Take 1 tablet (500 mg total) by mouth 2 (two) times a day with meals for 10 days (Patient not taking: Reported on 7/7/2024), Disp: 20 tablet, Rfl: 0    ondansetron (ZOFRAN) 4 mg tablet, Take 1 tablet (4 mg total) by mouth every 8 (eight) hours as needed for nausea or vomiting for up to 20 doses (Patient not taking: Reported on 7/7/2024), Disp: 8 tablet, Rfl: 0    ondansetron (ZOFRAN) 4 mg tablet, Take 1 tablet (4 mg total) by mouth every 8 (eight) hours as needed for nausea or vomiting for up to 20 doses (Patient not taking: Reported on 7/7/2024), Disp: 20 tablet, Rfl: 0    promethazine-dextromethorphan (PHENERGAN-DM) 6.25-15 mg/5 mL oral syrup, TAKE 5 ML BY MOUTH 4 TIMES A DAY AS NEEDED (Patient not taking: Reported on 4/11/2024), Disp: , Rfl:     tobramycin (TOBREX) 0.3 % SOLN, Administer 1 drop to the right eye every 4 (four) hours while awake (Patient not taking: Reported on 4/11/2024), Disp: , Rfl:     Current Allergies     Allergies as of 07/25/2024    (No Known Allergies)            The following portions of the patient's history were reviewed and updated as appropriate: allergies, current medications, past family history, past medical history, past  "social history, past surgical history and problem list.     Past Medical History:   Diagnosis Date    Enlargement, spleen     Hydronephrosis        Past Surgical History:   Procedure Laterality Date    KIDNEY SURGERY      5 mths old       Family History   Problem Relation Age of Onset    No Known Problems Mother     No Known Problems Father          Medications have been verified.        Objective   /78   Pulse 95   Temp 97.6 °F (36.4 °C) (Tympanic)   Resp 14   Ht 5' 2\" (1.575 m)   Wt 46 kg (101 lb 6.4 oz)   LMP 06/27/2024 (Approximate)   SpO2 99%   BMI 18.55 kg/m²        Physical Exam     Physical Exam  Vitals and nursing note reviewed.   Constitutional:       General: She is not in acute distress.     Appearance: She is well-developed. She is not ill-appearing or toxic-appearing.   HENT:      Head: Normocephalic and atraumatic.      Right Ear: External ear normal.      Left Ear: External ear normal.      Nose: Mucosal edema and congestion present.      Mouth/Throat:      Pharynx: Posterior oropharyngeal erythema present. No oropharyngeal exudate.      Tonsils: No tonsillar abscesses.   Cardiovascular:      Rate and Rhythm: Normal rate and regular rhythm.   Pulmonary:      Effort: Pulmonary effort is normal. No respiratory distress.      Breath sounds: No wheezing, rhonchi or rales.   Musculoskeletal:      Cervical back: Neck supple.   Skin:     General: Skin is warm and dry.      Coloration: Skin is not pale.      Findings: No rash.   Neurological:      Mental Status: She is alert and oriented to person, place, and time.   Psychiatric:         Mood and Affect: Mood normal.         Behavior: Behavior normal.         Thought Content: Thought content normal.         Judgment: Judgment normal.                   "

## 2024-07-25 NOTE — LETTER
July 25, 2024     Patient: Celsa Sam   YOB: 2000   Date of Visit: 7/25/2024       To Whom it May Concern:    Celsa Sam was seen in my clinic on 7/25/2024. She may return to work 7/26/2024.    If you have any questions or concerns, please don't hesitate to call.         Sincerely,          Kevin Hernandez MD        CC: No Recipients

## 2024-07-29 ENCOUNTER — APPOINTMENT (OUTPATIENT)
Age: 24
End: 2024-07-29
Payer: COMMERCIAL

## 2024-07-30 ENCOUNTER — APPOINTMENT (OUTPATIENT)
Age: 24
End: 2024-07-30
Payer: COMMERCIAL

## 2024-08-16 ENCOUNTER — OFFICE VISIT (OUTPATIENT)
Age: 24
End: 2024-08-16
Payer: COMMERCIAL

## 2024-08-16 VITALS
TEMPERATURE: 98.4 F | RESPIRATION RATE: 18 BRPM | HEIGHT: 62 IN | HEART RATE: 89 BPM | DIASTOLIC BLOOD PRESSURE: 74 MMHG | BODY MASS INDEX: 20.28 KG/M2 | WEIGHT: 110.2 LBS | SYSTOLIC BLOOD PRESSURE: 115 MMHG | OXYGEN SATURATION: 99 %

## 2024-08-16 DIAGNOSIS — A08.4 VIRAL GASTROENTERITIS: Primary | ICD-10-CM

## 2024-08-16 PROCEDURE — G0382 LEV 3 HOSP TYPE B ED VISIT: HCPCS | Performed by: PHYSICIAN ASSISTANT

## 2024-08-16 PROCEDURE — S9083 URGENT CARE CENTER GLOBAL: HCPCS | Performed by: PHYSICIAN ASSISTANT

## 2024-08-16 RX ORDER — DICYCLOMINE HCL 20 MG
20 TABLET ORAL EVERY 6 HOURS PRN
Qty: 20 TABLET | Refills: 0 | Status: SHIPPED | OUTPATIENT
Start: 2024-08-16

## 2024-08-16 NOTE — LETTER
August 16, 2024     Patient: Celsa Sam   YOB: 2000   Date of Visit: 8/16/2024       To Whom it May Concern:    Celsa Sam was seen in my clinic on 8/16/2024. She may return to work on 8/19/2024 .    If you have any questions or concerns, please don't hesitate to call.         Sincerely,          Jayson Anne PA-C        CC: No Recipients

## 2024-08-18 ENCOUNTER — OFFICE VISIT (OUTPATIENT)
Age: 24
End: 2024-08-18
Payer: COMMERCIAL

## 2024-08-18 VITALS
WEIGHT: 99.8 LBS | DIASTOLIC BLOOD PRESSURE: 77 MMHG | TEMPERATURE: 100.4 F | BODY MASS INDEX: 18.25 KG/M2 | OXYGEN SATURATION: 99 % | SYSTOLIC BLOOD PRESSURE: 106 MMHG | RESPIRATION RATE: 14 BRPM | HEART RATE: 135 BPM

## 2024-08-18 DIAGNOSIS — B34.9 ACUTE VIRAL SYNDROME: Primary | ICD-10-CM

## 2024-08-18 PROCEDURE — S9083 URGENT CARE CENTER GLOBAL: HCPCS

## 2024-08-18 PROCEDURE — G0382 LEV 3 HOSP TYPE B ED VISIT: HCPCS

## 2024-08-18 NOTE — LETTER
August 18, 2024     Patient: Celsa Sam   YOB: 2000   Date of Visit: 8/18/2024       To Whom it May Concern:    Celsa Sam was seen in my clinic on 8/18/2024. She may return to work on 8/22/2024 or sooner if feeling  better .    If you have any questions or concerns, please don't hesitate to call.         Sincerely,          Linda Bhatti PA-C        CC: No Recipients

## 2024-08-20 NOTE — PROGRESS NOTES
[facility]        NAME: Celsa Sam is a 24 y.o. female  : 2000    MRN: [id number]  DATE: 2024  TIME: 2:19 PM    Assessment and Plan   Viral gastroenteritis [A08.4]  1. Viral gastroenteritis  dicyclomine (BENTYL) 20 mg tablet            Patient Instructions     Pt has what I suspect is viral gastroenteritis as cause for her symptoms. I prescribed her Dicyclomine and rec fluids, rest, BRAT diet advancing slowly as tolerated, close observation. Should be reevaluated if condition persists/worsens despite treatment.   Follow up with PCP in 3-5 days.  Proceed to  ER if symptoms worsen.    If tests have been performed at [facility], our office will contact you with results if changes need to be made to the care plan discussed with you at the visit.  [facility]    Chief Complaint     Chief Complaint   Patient presents with    Abdominal Pain     Started yesterday she started getting headaches and abdominal pain, she states she works at a  and theres multiple people with the same symptoms. She states it could be a stomach bug. She hasn't taken anything.          History of Present Illness       Pt presents with onset yesterday of abdominal pain, diarrhea, HA. Denies N/V, fever, chills, blood in stool. She works at a  where similar illness/symptoms is/are going around. Has not taken anything for symptoms. Denies recent change in diet or knowingly eating anything spoiled.     Abdominal Pain  Associated symptoms include diarrhea and headaches. Pertinent negatives include no nausea or vomiting.       Review of Systems   Review of Systems   Constitutional: Negative.    Respiratory: Negative.     Cardiovascular: Negative.    Gastrointestinal:  Positive for abdominal pain and diarrhea. Negative for blood in stool, nausea and vomiting.   Genitourinary: Negative.    Neurological:  Positive for headaches.         Current Medications        Current Outpatient Medications:     dicyclomine (BENTYL) 20 mg tablet, Take 1 tablet (20 mg total) by mouth every 6 (six) hours as needed (diarrhea, abdominal cramping) (Patient not taking: Reported on 8/18/2024), Disp: 20 tablet, Rfl: 0    cyclobenzaprine (FLEXERIL) 5 mg tablet, Take 1 tablet (5 mg total) by mouth 3 (three) times a day as needed for muscle spasms (Patient not taking: Reported on 8/16/2024), Disp: 20 tablet, Rfl: 0    naproxen (Naprosyn) 500 mg tablet, Take 1 tablet (500 mg total) by mouth 2 (two) times a day with meals for 10 days (Patient not taking: Reported on 7/7/2024), Disp: 20 tablet, Rfl: 0    ondansetron (ZOFRAN) 4 mg tablet, Take 1 tablet (4 mg total) by mouth every 8 (eight) hours as needed for nausea or vomiting for up to 20 doses (Patient not taking: Reported on 8/16/2024), Disp: 8 tablet, Rfl: 0    ondansetron (ZOFRAN) 4 mg tablet, Take 1 tablet (4 mg total) by mouth every 8 (eight) hours as needed for nausea or vomiting for up to 20 doses (Patient not taking: Reported on 7/7/2024), Disp: 20 tablet, Rfl: 0    predniSONE 10 mg tablet, Take once daily all days pills on this schedule 4 -4 -3 -3 -2- 2 -1 (Patient not taking: Reported on 8/16/2024), Disp: 19 tablet, Rfl: 0    promethazine-dextromethorphan (PHENERGAN-DM) 6.25-15 mg/5 mL oral syrup, TAKE 5 ML BY MOUTH 4 TIMES A DAY AS NEEDED (Patient not taking: Reported on 4/11/2024), Disp: , Rfl:     tobramycin (TOBREX) 0.3 % SOLN, Administer 1 drop to the right eye every 4 (four) hours while awake (Patient not taking: Reported on 4/11/2024), Disp: , Rfl:     Current Allergies     Allergies as of 08/16/2024    (No Known Allergies)            The following portions of the patient's history were reviewed and updated as appropriate: allergies, current medications, past family history, past medical history, past social history, past surgical history and problem list.     Past Medical History:   Diagnosis Date    Enlargement, spleen      "Hydronephrosis        Past Surgical History:   Procedure Laterality Date    KIDNEY SURGERY      5 mths old       Family History   Problem Relation Age of Onset    No Known Problems Mother     No Known Problems Father          Medications have been verified.        Objective   /74   Pulse 89   Temp 98.4 °F (36.9 °C) (Tympanic)   Resp 18   Ht 5' 2\" (1.575 m)   Wt 50 kg (110 lb 3.2 oz)   LMP 08/11/2024 (Approximate)   SpO2 99%   BMI 20.16 kg/m²   Patient's last menstrual period was 08/11/2024 (approximate).       Physical Exam     Physical Exam  Vitals reviewed.   Constitutional:       General: She is not in acute distress.     Appearance: She is well-developed.   HENT:      Mouth/Throat:      Mouth: Mucous membranes are moist.      Pharynx: Oropharynx is clear.   Cardiovascular:      Rate and Rhythm: Normal rate and regular rhythm.      Pulses: Normal pulses.      Heart sounds: Normal heart sounds. No murmur heard.  Pulmonary:      Effort: Pulmonary effort is normal. No respiratory distress.      Breath sounds: Normal breath sounds.   Abdominal:      General: Bowel sounds are normal. There is no distension.      Palpations: Abdomen is soft. There is no mass.      Tenderness: There is abdominal tenderness (Mild diffuse abdominal TTP). There is no guarding or rebound.      Hernia: No hernia is present.   Musculoskeletal:      Cervical back: Neck supple.   Lymphadenopathy:      Cervical: No cervical adenopathy.   Neurological:      Mental Status: She is alert and oriented to person, place, and time.                   "

## 2024-09-19 ENCOUNTER — OFFICE VISIT (OUTPATIENT)
Age: 24
End: 2024-09-19
Payer: COMMERCIAL

## 2024-09-19 VITALS
TEMPERATURE: 98.3 F | HEART RATE: 102 BPM | OXYGEN SATURATION: 99 % | DIASTOLIC BLOOD PRESSURE: 62 MMHG | BODY MASS INDEX: 19.03 KG/M2 | WEIGHT: 103.4 LBS | SYSTOLIC BLOOD PRESSURE: 96 MMHG | RESPIRATION RATE: 18 BRPM | HEIGHT: 62 IN

## 2024-09-19 DIAGNOSIS — J01.00 ACUTE NON-RECURRENT MAXILLARY SINUSITIS: Primary | ICD-10-CM

## 2024-09-19 PROCEDURE — G0382 LEV 3 HOSP TYPE B ED VISIT: HCPCS | Performed by: NURSE PRACTITIONER

## 2024-09-19 PROCEDURE — S9083 URGENT CARE CENTER GLOBAL: HCPCS | Performed by: NURSE PRACTITIONER

## 2024-09-19 RX ORDER — AMOXICILLIN 500 MG/1
500 CAPSULE ORAL EVERY 12 HOURS SCHEDULED
Qty: 14 CAPSULE | Refills: 0 | Status: SHIPPED | OUTPATIENT
Start: 2024-09-19 | End: 2024-09-26

## 2024-09-19 RX ORDER — METHYLPREDNISOLONE 4 MG
TABLET, DOSE PACK ORAL
Qty: 21 EACH | Refills: 0 | Status: SHIPPED | OUTPATIENT
Start: 2024-09-19

## 2024-09-19 NOTE — PROGRESS NOTES
[facility]        NAME: Celsa Sam is a 24 y.o. female  : 2000    MRN: [id number]  DATE: 2024  TIME: 7:17 PM    Assessment and Plan   Acute non-recurrent maxillary sinusitis [J01.00]  1. Acute non-recurrent maxillary sinusitis  methylPREDNISolone 4 MG tablet therapy pack    amoxicillin (AMOXIL) 500 mg capsule        Acute symptomatic educated most likely viral in origin at this point continue with otc medication and can start medrol dose pack and will provide script if symptoms worsen or persist into next week can start taking antibiotic. Educated on s/e and proper use of medication, f/u with pcp with worsening of symptoms/no improvement.     Patient Instructions       Follow up with PCP in 3-5 days.  Proceed to  ER if symptoms worsen.    If tests have been performed at [facility], our office will contact you with results if changes need to be made to the care plan discussed with you at the visit.  [facility]    Chief Complaint     Chief Complaint   Patient presents with    Sinusitis     Headache and runny nose that started 2 days ago.          History of Present Illness       Sinusitis  This is a new problem. The current episode started in the past 7 days. The problem has been gradually worsening since onset. There has been no fever. Associated symptoms include congestion, coughing, a hoarse voice, sinus pressure and swollen glands. Pertinent negatives include no chills, headaches, shortness of breath, sneezing or sore throat. Past treatments include nothing. The treatment provided no relief.       Review of Systems   Review of Systems   Constitutional:  Negative for activity change, appetite change, chills, fatigue and fever.   HENT:  Positive for congestion, hoarse voice, postnasal drip, sinus pressure and sinus pain. Negative for rhinorrhea, sneezing and sore throat.    Respiratory:  Positive for cough. Negative for chest  tightness, shortness of breath and wheezing.    Cardiovascular:  Negative for chest pain and palpitations.   Gastrointestinal:  Negative for abdominal pain, constipation, diarrhea, nausea and vomiting.   Musculoskeletal:  Negative for arthralgias and myalgias.   Skin:  Negative for color change, pallor and rash.   Neurological:  Negative for dizziness, weakness, light-headedness and headaches.   Hematological:  Negative for adenopathy.   Psychiatric/Behavioral:  Negative for agitation and confusion.          Current Medications       Current Outpatient Medications:     amoxicillin (AMOXIL) 500 mg capsule, Take 1 capsule (500 mg total) by mouth every 12 (twelve) hours for 7 days, Disp: 14 capsule, Rfl: 0    cyclobenzaprine (FLEXERIL) 5 mg tablet, Take 1 tablet (5 mg total) by mouth 3 (three) times a day as needed for muscle spasms, Disp: 20 tablet, Rfl: 0    dicyclomine (BENTYL) 20 mg tablet, Take 1 tablet (20 mg total) by mouth every 6 (six) hours as needed (diarrhea, abdominal cramping), Disp: 20 tablet, Rfl: 0    methylPREDNISolone 4 MG tablet therapy pack, Use as directed on package, Disp: 21 each, Rfl: 0    ondansetron (ZOFRAN) 4 mg tablet, Take 1 tablet (4 mg total) by mouth every 8 (eight) hours as needed for nausea or vomiting for up to 20 doses, Disp: 8 tablet, Rfl: 0    ondansetron (ZOFRAN) 4 mg tablet, Take 1 tablet (4 mg total) by mouth every 8 (eight) hours as needed for nausea or vomiting for up to 20 doses, Disp: 20 tablet, Rfl: 0    predniSONE 10 mg tablet, Take once daily all days pills on this schedule 4 -4 -3 -3 -2- 2 -1, Disp: 19 tablet, Rfl: 0    promethazine-dextromethorphan (PHENERGAN-DM) 6.25-15 mg/5 mL oral syrup, , Disp: , Rfl:     tobramycin (TOBREX) 0.3 % SOLN, Administer 1 drop to the right eye every 4 (four) hours while awake, Disp: , Rfl:     naproxen (Naprosyn) 500 mg tablet, Take 1 tablet (500 mg total) by mouth 2 (two) times a day with meals for 10 days (Patient not taking: Reported  "on 7/7/2024), Disp: 20 tablet, Rfl: 0    Current Allergies     Allergies as of 09/19/2024    (No Known Allergies)            The following portions of the patient's history were reviewed and updated as appropriate: allergies, current medications, past family history, past medical history, past social history, past surgical history and problem list.     Past Medical History:   Diagnosis Date    Enlargement, spleen     Hydronephrosis        Past Surgical History:   Procedure Laterality Date    KIDNEY SURGERY      5 mths old       Family History   Problem Relation Age of Onset    No Known Problems Mother     No Known Problems Father          Medications have been verified.        Objective   BP 96/62   Pulse 102   Temp 98.3 °F (36.8 °C)   Resp 18   Ht 5' 2\" (1.575 m)   Wt 46.9 kg (103 lb 6.4 oz)   LMP 09/03/2024   SpO2 99%   BMI 18.91 kg/m²   Patient's last menstrual period was 09/03/2024.       Physical Exam     Physical Exam  Vitals and nursing note reviewed.   Constitutional:       General: She is not in acute distress.     Appearance: Normal appearance. She is ill-appearing. She is not diaphoretic.   HENT:      Head: Normocephalic and atraumatic.      Right Ear: Tympanic membrane, ear canal and external ear normal. There is no impacted cerumen.      Left Ear: Tympanic membrane, ear canal and external ear normal. There is no impacted cerumen.      Nose: Congestion present. No rhinorrhea.      Mouth/Throat:      Pharynx: Posterior oropharyngeal erythema present.   Eyes:      General: No scleral icterus.        Right eye: No discharge.         Left eye: No discharge.      Conjunctiva/sclera: Conjunctivae normal.   Cardiovascular:      Rate and Rhythm: Normal rate and regular rhythm.   Pulmonary:      Effort: Pulmonary effort is normal. No respiratory distress.      Breath sounds: Normal breath sounds. No stridor. No wheezing, rhonchi or rales.   Musculoskeletal:         General: Normal range of motion.      " Cervical back: Normal range of motion.   Skin:     Coloration: Skin is not jaundiced or pale.      Findings: No bruising, erythema or rash.   Neurological:      General: No focal deficit present.      Mental Status: She is alert and oriented to person, place, and time.   Psychiatric:         Mood and Affect: Mood normal.         Behavior: Behavior normal.         Thought Content: Thought content normal.         Judgment: Judgment normal.

## 2024-09-19 NOTE — LETTER
September 19, 2024     Patient: Celsa Sam   YOB: 2000   Date of Visit: 9/19/2024       To Whom it May Concern:    Celsa Sam was seen in my clinic on 9/19/2024. She may return to work on 9/20/2024 .    If you have any questions or concerns, please don't hesitate to call.         Sincerely,          SILVESTRE Hernandez

## 2024-11-07 ENCOUNTER — OFFICE VISIT (OUTPATIENT)
Age: 24
End: 2024-11-07
Payer: COMMERCIAL

## 2024-11-07 VITALS
DIASTOLIC BLOOD PRESSURE: 65 MMHG | WEIGHT: 98.6 LBS | TEMPERATURE: 102.9 F | HEART RATE: 144 BPM | HEIGHT: 62 IN | SYSTOLIC BLOOD PRESSURE: 108 MMHG | BODY MASS INDEX: 18.14 KG/M2 | OXYGEN SATURATION: 96 % | RESPIRATION RATE: 20 BRPM

## 2024-11-07 DIAGNOSIS — B34.9 VIRAL ILLNESS: Primary | ICD-10-CM

## 2024-11-07 DIAGNOSIS — R50.81 FEVER IN OTHER DISEASES: ICD-10-CM

## 2024-11-07 LAB
SARS-COV-2 AG UPPER RESP QL IA: NEGATIVE
VALID CONTROL: NORMAL

## 2024-11-07 PROCEDURE — G0382 LEV 3 HOSP TYPE B ED VISIT: HCPCS | Performed by: NURSE PRACTITIONER

## 2024-11-07 PROCEDURE — 96372 THER/PROPH/DIAG INJ SC/IM: CPT | Performed by: NURSE PRACTITIONER

## 2024-11-07 PROCEDURE — 87811 SARS-COV-2 COVID19 W/OPTIC: CPT | Performed by: NURSE PRACTITIONER

## 2024-11-07 PROCEDURE — S9083 URGENT CARE CENTER GLOBAL: HCPCS | Performed by: NURSE PRACTITIONER

## 2024-11-07 RX ORDER — NITROFURANTOIN 25; 75 MG/1; MG/1
CAPSULE ORAL
COMMUNITY
Start: 2024-08-23

## 2024-11-07 RX ORDER — CEPHALEXIN 500 MG/1
500 CAPSULE ORAL 2 TIMES DAILY
COMMUNITY
Start: 2024-08-20

## 2024-11-07 RX ORDER — KETOROLAC TROMETHAMINE 30 MG/ML
30 INJECTION, SOLUTION INTRAMUSCULAR; INTRAVENOUS ONCE
Status: COMPLETED | OUTPATIENT
Start: 2024-11-07 | End: 2024-11-07

## 2024-11-07 RX ORDER — METHYLPREDNISOLONE 4 MG/1
TABLET ORAL
Qty: 21 EACH | Refills: 0 | Status: SHIPPED | OUTPATIENT
Start: 2024-11-07

## 2024-11-07 RX ADMIN — KETOROLAC TROMETHAMINE 30 MG: 30 INJECTION, SOLUTION INTRAMUSCULAR; INTRAVENOUS at 12:58

## 2024-11-07 NOTE — LETTER
November 7, 2024     Patient: Celsa Sam   YOB: 2000   Date of Visit: 11/7/2024       To Whom it May Concern:    Celsa Sam was seen in my clinic on 11/7/2024. She may return to work on 11/9/2024 .    If you have any questions or concerns, please don't hesitate to call.         Sincerely,          SILVESTRE Hernandez

## 2024-11-07 NOTE — PROGRESS NOTES
[facility]        NAME: Celsa Sam is a 24 y.o. female  : 2000    MRN: [id number]  DATE: 2024  TIME: 1:04 PM    Assessment and Plan   Viral illness [B34.9]  1. Viral illness  methylPREDNISolone 4 MG tablet therapy pack    amoxicillin-clavulanate (AUGMENTIN) 875-125 mg per tablet    ketorolac (TORADOL) injection 30 mg      2. Fever in other diseases  Poct Covid 19 Rapid Antigen Test        Acute symptomatic discussed with patient no recommendation for antibiotic at this time.  Provided Toradol 30 mg in office and can start Medrol Dosepak tomorrow educated on side effects proper use of medication will also give prescription for Augmentin twice daily x 7 days if no improvement into next week can start taking at that time.  Follow-up with PCP as needed    Patient Instructions       Follow up with PCP in 3-5 days.  Proceed to  ER if symptoms worsen.    If tests have been performed at [facility], our office will contact you with results if changes need to be made to the care plan discussed with you at the visit.  You can review your full results on [facility].    Chief Complaint     Chief Complaint   Patient presents with    Fever     Major head pain, feeling tired and nauseous, and also has fever. Started about 3 days ago. She took dayquil. It has helped her a little.         History of Present Illness       Patient is a 24-year-old female arrives with complaints of headache fever nausea fatigue rhinorrhea nasal congestion x 2 days.  Has been taking DayQuil with very mild relief.  Denies sore throat cough.  POCT rapid COVID was negative in office.  Does have a history of sinus infection.  Is having a birthday party on Saturday for her significant other really wants to get better in time.        Review of Systems   Review of Systems   Constitutional:  Positive for fatigue and fever. Negative for activity change, appetite change and chills.   HENT:  Positive for  congestion, postnasal drip and rhinorrhea. Negative for sneezing and sore throat.    Respiratory:  Negative for cough, chest tightness, shortness of breath and wheezing.    Cardiovascular:  Negative for chest pain and palpitations.   Gastrointestinal:  Positive for nausea. Negative for abdominal pain, constipation, diarrhea and vomiting.   Musculoskeletal:  Negative for arthralgias and myalgias.   Skin:  Negative for color change, pallor and rash.   Neurological:  Positive for headaches. Negative for dizziness, facial asymmetry, speech difficulty, weakness and light-headedness.   Hematological:  Negative for adenopathy.   Psychiatric/Behavioral:  Negative for agitation and confusion.          Current Medications       Current Outpatient Medications:     amoxicillin-clavulanate (AUGMENTIN) 875-125 mg per tablet, Take 1 tablet by mouth every 12 (twelve) hours for 7 days, Disp: 14 tablet, Rfl: 0    methylPREDNISolone 4 MG tablet therapy pack, Use as directed on package, Disp: 21 each, Rfl: 0    cephalexin (KEFLEX) 500 mg capsule, Take 500 mg by mouth 2 (two) times a day (Patient not taking: Reported on 11/7/2024), Disp: , Rfl:     cyclobenzaprine (FLEXERIL) 5 mg tablet, Take 1 tablet (5 mg total) by mouth 3 (three) times a day as needed for muscle spasms (Patient not taking: Reported on 11/7/2024), Disp: 20 tablet, Rfl: 0    dicyclomine (BENTYL) 20 mg tablet, Take 1 tablet (20 mg total) by mouth every 6 (six) hours as needed (diarrhea, abdominal cramping) (Patient not taking: Reported on 11/7/2024), Disp: 20 tablet, Rfl: 0    naproxen (Naprosyn) 500 mg tablet, Take 1 tablet (500 mg total) by mouth 2 (two) times a day with meals for 10 days (Patient not taking: Reported on 7/7/2024), Disp: 20 tablet, Rfl: 0    nitrofurantoin (MACROBID) 100 mg capsule, take 1 capsule by mouth twice a day for 7 days (Patient not taking: Reported on 11/7/2024), Disp: , Rfl:     ondansetron (ZOFRAN) 4 mg tablet, Take 1 tablet (4 mg total)  "by mouth every 8 (eight) hours as needed for nausea or vomiting for up to 20 doses (Patient not taking: Reported on 11/7/2024), Disp: 8 tablet, Rfl: 0    ondansetron (ZOFRAN) 4 mg tablet, Take 1 tablet (4 mg total) by mouth every 8 (eight) hours as needed for nausea or vomiting for up to 20 doses (Patient not taking: Reported on 11/7/2024), Disp: 20 tablet, Rfl: 0    predniSONE 10 mg tablet, Take once daily all days pills on this schedule 4 -4 -3 -3 -2- 2 -1 (Patient not taking: Reported on 11/7/2024), Disp: 19 tablet, Rfl: 0    promethazine-dextromethorphan (PHENERGAN-DM) 6.25-15 mg/5 mL oral syrup, , Disp: , Rfl:     tobramycin (TOBREX) 0.3 % SOLN, Administer 1 drop to the right eye every 4 (four) hours while awake (Patient not taking: Reported on 11/7/2024), Disp: , Rfl:     Current Facility-Administered Medications:     ketorolac (TORADOL) injection 30 mg, 30 mg, Intramuscular, Once,     Current Allergies     Allergies as of 11/07/2024    (No Known Allergies)            The following portions of the patient's history were reviewed and updated as appropriate: allergies, current medications, past family history, past medical history, past social history, past surgical history and problem list.     Past Medical History:   Diagnosis Date    Enlargement, spleen     Hydronephrosis        Past Surgical History:   Procedure Laterality Date    KIDNEY SURGERY      5 mths old       Family History   Problem Relation Age of Onset    No Known Problems Mother     No Known Problems Father          Medications have been verified.        Objective   /65   Pulse (!) 144   Temp (!) 102.9 °F (39.4 °C) (Tympanic)   Resp 20   Ht 5' 2\" (1.575 m)   Wt 44.7 kg (98 lb 9.6 oz)   LMP 11/01/2024 (Approximate)   SpO2 96%   BMI 18.03 kg/m²   Patient's last menstrual period was 11/01/2024 (approximate).       Physical Exam     Physical Exam  Vitals and nursing note reviewed.   Constitutional:       General: She is not in acute " distress.     Appearance: Normal appearance. She is ill-appearing. She is not diaphoretic.   HENT:      Head: Normocephalic and atraumatic.      Right Ear: Tympanic membrane, ear canal and external ear normal. There is no impacted cerumen.      Left Ear: Tympanic membrane, ear canal and external ear normal. There is no impacted cerumen.      Nose: Congestion present. No rhinorrhea.      Right Sinus: Maxillary sinus tenderness present. No frontal sinus tenderness.      Left Sinus: Maxillary sinus tenderness present. No frontal sinus tenderness.      Mouth/Throat:      Pharynx: Posterior oropharyngeal erythema present.   Eyes:      General: No scleral icterus.        Right eye: No discharge.         Left eye: No discharge.      Conjunctiva/sclera: Conjunctivae normal.   Cardiovascular:      Rate and Rhythm: Normal rate and regular rhythm.   Pulmonary:      Effort: Pulmonary effort is normal. No respiratory distress.      Breath sounds: Normal breath sounds. No stridor. No wheezing, rhonchi or rales.   Musculoskeletal:         General: Normal range of motion.      Cervical back: Normal range of motion.   Lymphadenopathy:      Cervical: No cervical adenopathy.   Skin:     Coloration: Skin is not jaundiced or pale.      Findings: No bruising, erythema or rash.   Neurological:      General: No focal deficit present.      Mental Status: She is alert and oriented to person, place, and time.   Psychiatric:         Mood and Affect: Mood normal.         Behavior: Behavior normal.         Thought Content: Thought content normal.         Judgment: Judgment normal.

## 2024-11-25 ENCOUNTER — OFFICE VISIT (OUTPATIENT)
Age: 24
End: 2024-11-25

## 2024-11-25 VITALS
TEMPERATURE: 98.8 F | SYSTOLIC BLOOD PRESSURE: 100 MMHG | WEIGHT: 95 LBS | BODY MASS INDEX: 17.48 KG/M2 | HEIGHT: 62 IN | DIASTOLIC BLOOD PRESSURE: 70 MMHG | HEART RATE: 107 BPM | OXYGEN SATURATION: 99 %

## 2024-11-25 DIAGNOSIS — R10.9 STOMACH PAIN: Primary | ICD-10-CM

## 2024-11-25 DIAGNOSIS — K59.00 CONSTIPATION, UNSPECIFIED CONSTIPATION TYPE: ICD-10-CM

## 2024-11-25 PROCEDURE — 99213 OFFICE O/P EST LOW 20 MIN: CPT | Performed by: STUDENT IN AN ORGANIZED HEALTH CARE EDUCATION/TRAINING PROGRAM

## 2024-11-25 RX ORDER — DOCUSATE SODIUM 100 MG/1
100 CAPSULE, LIQUID FILLED ORAL 2 TIMES DAILY PRN
Qty: 60 CAPSULE | Refills: 0 | Status: SHIPPED | OUTPATIENT
Start: 2024-11-25

## 2024-11-25 RX ORDER — TOBRAMYCIN 3 MG/ML
1 SOLUTION/ DROPS OPHTHALMIC 4 TIMES DAILY
Qty: 5 ML | Refills: 0 | Status: CANCELLED
Start: 2024-11-25

## 2024-11-25 NOTE — LETTER
November 25, 2024     Patient: Celsa Sam  YOB: 2000  Date of Visit: 11/25/2024      To Whom it May Concern:    Celsa Sam is under my professional care. Celsa was seen in my office on 11/25/2024. Celsa may return to work on 11/26/24 .    If you have any questions or concerns, please don't hesitate to call.         Sincerely,          Oscar Ren, DO

## 2024-11-25 NOTE — PROGRESS NOTES
"Name: Celsa Sam      : 2000      MRN: [id number]  Encounter Provider: Oscar Ren DO  Encounter Date: 2024   Encounter department: [facility] IN PARTNERSHIP WITH [facility]  :  Assessment & Plan  Stomach pain    Orders:    docusate sodium (COLACE) 100 mg capsule; Take 1 capsule (100 mg total) by mouth 2 (two) times a day as needed for constipation    Constipation, unspecified constipation type    Orders:    docusate sodium (COLACE) 100 mg capsule; Take 1 capsule (100 mg total) by mouth 2 (two) times a day as needed for constipation             Patient is a 24-year-old female who is presenting today for pinkeye and chronic stomach pains.  On examination she has no signs of pinkeye.  She is safe to go to work tomorrow.  I believe her stomach pains are from the constipation.  Will start patient on Colace 100 mg twice daily as needed for constipation.  Stay adequately hydrated.  Will give patient information on an IBS type diet as there is IBS in the family.    History of Present Illness     HPI  Review of Systems   Constitutional:  Negative for fever.   Eyes:  Negative for discharge and redness.   Respiratory:  Negative for shortness of breath.    Cardiovascular:  Negative for chest pain.   Gastrointestinal:  Positive for abdominal pain. Negative for constipation and diarrhea.   Skin:  Negative for rash.     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  She was asked to have a check up by her doctor to make sure she is not having pinkeye.  She denies any redness or discharge.    Patient also has a history of chronic stomach pains.  She has been constipated for several days and has not had a bowel movement.  She has pain on the left upper and lower quadrants.     Objective   /70 (BP Location: Left arm, Patient Position: Sitting, Cuff Size: Standard)   Pulse (!) 107   Temp 98.8 °F (37.1 °C)   Ht 5' 2\" " (1.575 m)   Wt 43.1 kg (95 lb)   LMP 11/01/2024 (Approximate)   SpO2 99%   BMI 17.38 kg/m²      Physical Exam  Vitals and nursing note reviewed.   Constitutional:       General: She is not in acute distress.     Appearance: Normal appearance. She is well-developed.   HENT:      Head: Normocephalic and atraumatic.      Right Ear: External ear normal.      Left Ear: External ear normal.      Nose: Nose normal.      Mouth/Throat:      Mouth: Mucous membranes are moist.   Eyes:      Conjunctiva/sclera: Conjunctivae normal.   Cardiovascular:      Rate and Rhythm: Normal rate and regular rhythm.      Heart sounds: Normal heart sounds. No murmur heard.  Pulmonary:      Effort: Pulmonary effort is normal. No respiratory distress.      Breath sounds: Normal breath sounds.   Abdominal:      Tenderness: There is abdominal tenderness (luq, llq).   Skin:     General: Skin is warm and dry.      Findings: No rash.   Neurological:      General: No focal deficit present.      Mental Status: She is alert and oriented to person, place, and time. Mental status is at baseline.   Psychiatric:         Mood and Affect: Mood normal.         Behavior: Behavior normal.         Thought Content: Thought content normal.         Judgment: Judgment normal.       Administrative Statements   I have spent a total time of 20 minutes in caring for this patient on the day of the visit/encounter including Instructions for management, Documenting in the medical record, and Obtaining or reviewing history  .